# Patient Record
Sex: MALE | Race: WHITE | NOT HISPANIC OR LATINO | Employment: FULL TIME | URBAN - METROPOLITAN AREA
[De-identification: names, ages, dates, MRNs, and addresses within clinical notes are randomized per-mention and may not be internally consistent; named-entity substitution may affect disease eponyms.]

---

## 2018-10-12 ENCOUNTER — RECORDS - HEALTHEAST (OUTPATIENT)
Dept: LAB | Facility: CLINIC | Age: 53
End: 2018-10-12

## 2018-10-12 LAB — PSA SERPL-MCNC: 0.9 NG/ML (ref 0–3.5)

## 2021-05-26 ENCOUNTER — RECORDS - HEALTHEAST (OUTPATIENT)
Dept: ADMINISTRATIVE | Facility: CLINIC | Age: 56
End: 2021-05-26

## 2021-05-27 ENCOUNTER — RECORDS - HEALTHEAST (OUTPATIENT)
Dept: ADMINISTRATIVE | Facility: CLINIC | Age: 56
End: 2021-05-27

## 2021-05-30 ENCOUNTER — RECORDS - HEALTHEAST (OUTPATIENT)
Dept: ADMINISTRATIVE | Facility: CLINIC | Age: 56
End: 2021-05-30

## 2021-10-28 ENCOUNTER — OFFICE VISIT (OUTPATIENT)
Dept: NEUROLOGY | Facility: CLINIC | Age: 56
End: 2021-10-28
Payer: COMMERCIAL

## 2021-10-28 VITALS
WEIGHT: 301.6 LBS | BODY MASS INDEX: 36.73 KG/M2 | SYSTOLIC BLOOD PRESSURE: 121 MMHG | HEART RATE: 84 BPM | HEIGHT: 76 IN | DIASTOLIC BLOOD PRESSURE: 69 MMHG

## 2021-10-28 DIAGNOSIS — R47.9 SPEECH DISTURBANCE, UNSPECIFIED TYPE: ICD-10-CM

## 2021-10-28 DIAGNOSIS — R47.89 WORD FINDING DIFFICULTY: ICD-10-CM

## 2021-10-28 DIAGNOSIS — R47.01 EXPRESSIVE APHASIA: Primary | ICD-10-CM

## 2021-10-28 PROCEDURE — 99204 OFFICE O/P NEW MOD 45 MIN: CPT | Performed by: PSYCHIATRY & NEUROLOGY

## 2021-10-28 ASSESSMENT — MONTREAL COGNITIVE ASSESSMENT (MOCA)
13. ORIENTATION SUBSCORE: 6
11. FOR EACH PAIR OF WORDS, WHAT CATEGORY DO THEY BELONG TO (OUT OF 2): 2
WHAT IS THE TOTAL SCORE (OUT OF 30): 17
7. [VIGILENCE] TAP WHEN HEARING DESIGNATED LETTER: 1
4. NAME EACH OF THE THREE ANIMALS SHOWN: 3
WHAT LEVEL OF EDUCATION WAS ATTAINED: 0
8. SERIAL SUBTRACTION OF 7S: 2
12. MEMORY INDEX SCORE: 0
10. [FLUENCY] NAME WORDS STARTING WITH DESIGNATED LETTER: 0
9. REPEAT EACH SENTENCE: 0
VISUOSPATIAL/EXECUTIVE SUBSCORE: 2
6. READ LIST OF DIGITS [FORWARD/BACKWARD]: 1

## 2021-10-28 ASSESSMENT — MIFFLIN-ST. JEOR: SCORE: 2309.55

## 2021-10-28 NOTE — PATIENT INSTRUCTIONS
Plan:  -- MRI Brain. We will notify you of the results.  -- Referral to Neuropsych for cognitive/language evaluation.  Based on what the testing shows, I will make additional recommendations for evaluation/treatment.  -- Return to Neurology clinic after the neuropsych testing is completed.

## 2021-10-28 NOTE — NURSING NOTE
IMTIAZ COGNITIVE ASSESSMENT (MOCA)  Version 7.1 Original Version  VISUOSPATIAL/EXECUTIVE               COPY CUBE      [  0  ]                                [  0  ] DRAW CLOCK (Ten past eleven)  (3 points)    [1    ]                    [ 1   ]               [ 0   ]       Contour            Numbers     Hands POINTS                 2  / 5   NAMING    [ 1  ]                                                                        [ 1   ]                                             [  1  ]  Lireid Valadez                                Camel                   3  / 3   MEMORY Read list of words, subject must repeat them. Do 2 trials, even if 1st trial is successful. Do a recall after 5 minutes  FACE VELVET Hoahaoism FRITZ RED No Points    1st + + + - -     2nd + + + + +    ATTENTION Read list of digits (1 digit/sec) Subject has to repeat in the forward order       [  0  ]   2  1  8  5  4                                [ 1   ] 7 4 2                        1  /2   Read list of letters. The subject must tap with his hand at each letter A. No points if > 2 errors.  [  1  ] F B A C M N A A J K L B A F A K D E A A A J A M O F A A B          1    /1   Serial 7 subtraction starting at 100          [   1 ] 93         [  1  ] 86          [  1  ] 79          [  0  ] 72         [  0  ] 65   4 or 5 correct subtractions: 3 points,  2 or 3 correct: 2 points,  1correct: 1 point,   0 correct: 0 points         2   /3   LANGUAGE Repeat: I only know that Abad is the one to help today. [ 0    ]                                      The cat always hid under the couch when dogs were in the room. [ 0  ]             0  /2   Fluency: Name maximum number of words in one minute that begin with the letter F                                                                                                                    [ 0   ] __5_ (N > 11 words)             0  /1   ABSTRACTION  Similarity between e.g. banana-orange=fruit                                                                   [ 1   ] train-bicycle                      [  1  ] watch-ruler           2   /2   DELAYED  RECALL Has to recall words  WITH NO CUE FACE  [ 0   ] VELVET  [ 0   ] Rastafari  [ 0   ]  FRITZ  [  0  ] RED  [ 0   ] Points for UNCUED recall only         0   /5           OPTIONAL Category cue           Multiple choice cue          ORIENTATION  [ 1   ] Date     [ 1   ] Month       [  1  ] Year      [  1  ] Day      [  1  ] Place        [  1  ] City        6 /6   TOTAL  Normal > 26/30 Add 1 point if < 12 years education     17 /30

## 2021-10-28 NOTE — PROGRESS NOTES
"INITIAL NEUROLOGY CONSULTATION    DATE OF VISIT: 10/28/2021  MRN: [id number]  PATIENT NAME: Robbin Forrester  YOB: 1965    REFERRING PROVIDER: Vic Hanna    Chief Complaint   Patient presents with     Episodes of speech difficulty       SUBJECTIVE:                                                      HPI:   Robbin Forrester is a 56 year old male whom I have been asked by Dr. Hanna to see in consultation for \"memory/speech.\"  There are very few records available on the patient.  The medical records department at [facility] he was last seen there in January 2020.  The provider listed as the referring provider, saw the patient back in 2013 in the [facility] ER, no more-recent visits.    Robbin is accompanied by his wife today.  They confirm that he does still follow-up with Dr. Hanna, at an [facility] clinic on [address].  They ask the reason for his visit, to be starts to tell me about his work schedule being somewhat unusual.  At first I do not really understand what he is trying to tell me.  Through clarification I found out that he works third shift.  Because of this his sleep cycle has been disrupted in recent years.  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  Robbin tells me that at work, he has 6 different duties at work. When he gets to the end of the day, he tends to fall asleep with his dogs. When he is woken from sleep, he has trouble with slowness and difficulty with annunciation. His wife confirms that his speech is much worse when he first wakes. She has noticed word-finding difficulties in general for about 2 years, which correlates with the time he started working overnights. No problems with memory from their standpoint.  He has found it helpful to try and keep his dogs on a regular schedule, so that he is woken less frequently.  On his weekends, or if his wife is home, he is able to get much more restful sleep.    His week begins on Sunday. He says he can be up 24-26 hours in a row.  " "He has not really noticed problems with remembering people's names.  No mention of problems with writing.    He denies problems with driving, though his wife has noticed that he crosses over the [place] line sometimes, and roundabouts are very difficult for him to maneuver.  He does have an eye condition that affects his ability to see well during the day, so she wonders if it is related to this.  He feels that he sees better driving at night.    He has not had problems with getting lost driving.      Robbin denies weakness or sensory changes.  He is not typically on headaches.  No problems with swallow.  No problems controlling his bladder or bowels.  Wife has not noticed a change in his gait.  He has not had any work-up for the language issues as of yet.  No recent brain imaging.    He has 3 dogs.    No family history of dementia.    History reviewed. No pertinent past medical history.  History reviewed. No pertinent surgical history.    MULTIPLE VITAMIN PO, Take by mouth daily    No current facility-administered medications on file prior to visit.    Allergies   Allergen Reactions     Cat Hair Extract Unknown        Problem (# of Occurrences) Relation (Name,Age of Onset)    Cancer (1) Mother        Social History     Tobacco Use     Smoking status: Never Smoker     Smokeless tobacco: Never Used   Substance Use Topics     Alcohol use: None     Comment: seldom     Drug use: Not Currently       REVIEW OF SYSTEMS:                                                      10-point review of systems is negative except as mentioned above in HPI.     EXAM:                                                      Physical Exam:   Vitals: /69 (BP Location: Right arm)   Pulse 84   Ht 1.93 m (6' 4\")   Wt 136.8 kg (301 lb 9.6 oz)   BMI 36.71 kg/m    BMI= Body mass index is 36.71 kg/m .  GENERAL: NAD.  HEENT: NC/AT.   CV: RRR. S1, S2.   NECK: No bruits.  PULM: Non-labored breathing.   Neurologic:  MENTAL STATUS: Alert, attentive. " Speech is slow, with hesitation and clear word finding difficulties.  He does not seem to have any problems with comprehension.. Normal comprehension. MoCA: 17/30 (normal is 26 and above). Normal concentration. Fair fund of knowledge.   CRANIAL NERVES: Discs flat. Visual fields intact to confrontation. Pupils equally, round and reactive to light. Facial sensation and movement normal. EOM full. Hearing intact to conversation. Trapezius strength intact. Palate moves symmetrically. Tongue midline.  MOTOR: 5/5 in proximal and distal muscle groups of upper and lower extremities. Tone and bulk normal.   DTRs: Intact and symmetric in biceps, BR, patellae.  Babinski down-going bilaterally.   SENSATION: Normal light touch and pinprick. Intact proprioception at great toes. Vibration: Decreased at both ankles.   COORDINATION: Normal finger nose finger. Finger tapping normal. Knee heel shin normal.  STATION AND GAIT: Romberg negative.  Casual gait is normal.  Left hand-dominant.  No frontal release signs.    ASSESSMENT and PLAN:                                                      Assessment:     ICD-10-CM    1. Expressive aphasia  R47.01    2. Speech disturbance, unspecified type  R47.9 MR Brain w/o & w Contrast     NEUROPSYCHOLOGY REFERRAL   3. Word finding difficulty  R47.89 MR Brain w/o & w Contrast     NEUROPSYCHOLOGY REFERRAL        Mr. Forrester is a pleasant 56-year-old man with little medical history, here for evaluation regarding speech difficulties.  On exam, he has difficulty with expressive language and word-finding difficulties.  He also seems a little bit slow to answer my questions, though this could just be due to his speech difficulties. He scored 17/30 on the [place] today, with difficulties in multiple tasks including visuospatial, recall, attention and significantly: language.    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  Of course, he does have this history of a change in his  schedule with work, which could be contributing.  I explained to Robbin that some speech conditions can be rehabilitated, really like to make sure you understand what it is that we are dealing with at this point.  He is in agreement.  I would like to get an MRI of his brain to rule out structural cause and then send him on for neuropsych evaluation.  We will see him back thereafter.  Robbin and his wife expressed understanding and agreement with the plan.    Plan:  -- MRI Brain. We will notify you of the results.  -- Referral to Neuropsych for cognitive/language evaluation.  Based on what the testing shows, I will make additional recommendations for evaluation/treatment.  -- Return to Neurology clinic after the neuropsych testing is completed.     Total Time: 45 minutes were spent with the patient and in chart review/documentation (as described above in Assessment and Plan) /coordinating the care on date of service.    Irma Jackson MD  Neurology    CC: Vic Hanna MD    Dragon software used in the dictation of this note.

## 2021-10-28 NOTE — NURSING NOTE
Chief Complaint   Patient presents with     Episodes of speech difficulty       Dariela Craft RN on 10/28/2021 at 1:40 PM

## 2021-10-28 NOTE — LETTER
"    10/28/2021         RE: Robbin Forrester  [address] [address]        Dear Colleague,    Thank you for referring your patient, Robbin Forrester, to the [facility] [place]. Please see a copy of my visit note below.    INITIAL NEUROLOGY CONSULTATION    DATE OF VISIT: 10/28/2021  MRN: [id number]  PATIENT NAME: Robbin Forrester  YOB: 1965    REFERRING PROVIDER: Vic Hanna    Chief Complaint   Patient presents with     Episodes of speech difficulty       SUBJECTIVE:                                                      HPI:   Robbin Forrester is a 56 year old male whom I have been asked by Dr. Hanna to see in consultation for \"memory/speech.\"  There are very few records available on the patient.  The medical records department at [facility] he was last seen there in January 2020.  The provider listed as the referring provider, saw the patient back in 2013 in the [facility] ER, no more-recent visits.    Robbin is accompanied by his wife today.  They confirm that he does still follow-up with Dr. Hanna, at an [facility] clinic on [address].  They ask the reason for his visit, to be starts to tell me about his work schedule being somewhat unusual.  At first I do not really understand what he is trying to tell me.  Through clarification I found out that he works third shift.  Because of this his sleep cycle has been disrupted in recent years.  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  Robbin tells me that at work, he has 6 different duties at work. When he gets to the end of the day, he tends to fall asleep with his dogs. When he is woken from sleep, he has trouble with slowness and difficulty with annunciation. His wife confirms that his speech is much worse when he first wakes. She has noticed word-finding difficulties in general for about 2 years, which correlates with the time he started working overnights. No problems with memory from their standpoint.  He has found it " helpful to try and keep his dogs on a regular schedule, so that he is woken less frequently.  On his weekends, or if his wife is home, he is able to get much more restful sleep.    His week begins on Sunday. He says he can be up 24-26 hours in a row.  He has not really noticed problems with remembering people's names.  No mention of problems with writing.    He denies problems with driving, though his wife has noticed that he crosses over the [place] line sometimes, and roundabouts are very difficult for him to maneuver.  He does have an eye condition that affects his ability to see well during the day, so she wonders if it is related to this.  He feels that he sees better driving at night.    He has not had problems with getting lost driving.      Robbin denies weakness or sensory changes.  He is not typically on headaches.  No problems with swallow.  No problems controlling his bladder or bowels.  Wife has not noticed a change in his gait.  He has not had any work-up for the language issues as of yet.  No recent brain imaging.    He has 3 dogs.    No family history of dementia.    History reviewed. No pertinent past medical history.  History reviewed. No pertinent surgical history.    MULTIPLE VITAMIN PO, Take by mouth daily    No current facility-administered medications on file prior to visit.    Allergies   Allergen Reactions     Cat Hair Extract Unknown        Problem (# of Occurrences) Relation (Name,Age of Onset)    Cancer (1) Mother        Social History     Tobacco Use     Smoking status: Never Smoker     Smokeless tobacco: Never Used   Substance Use Topics     Alcohol use: None     Comment: seldom     Drug use: Not Currently       REVIEW OF SYSTEMS:                                                      10-point review of systems is negative except as mentioned above in HPI.     EXAM:                                                      Physical Exam:   Vitals: /69 (BP Location: Right arm)   Pulse 84    "Ht 1.93 m (6' 4\")   Wt 136.8 kg (301 lb 9.6 oz)   BMI 36.71 kg/m    BMI= Body mass index is 36.71 kg/m .  GENERAL: NAD.  HEENT: NC/AT.   CV: RRR. S1, S2.   NECK: No bruits.  PULM: Non-labored breathing.   Neurologic:  MENTAL STATUS: Alert, attentive. Speech is slow, with hesitation and clear word finding difficulties.  He does not seem to have any problems with comprehension.. Normal comprehension. MoCA: 17/30 (normal is 26 and above). Normal concentration. Fair fund of knowledge.   CRANIAL NERVES: Discs flat. Visual fields intact to confrontation. Pupils equally, round and reactive to light. Facial sensation and movement normal. EOM full. Hearing intact to conversation. Trapezius strength intact. Palate moves symmetrically. Tongue midline.  MOTOR: 5/5 in proximal and distal muscle groups of upper and lower extremities. Tone and bulk normal.   DTRs: Intact and symmetric in biceps, BR, patellae.  Babinski down-going bilaterally.   SENSATION: Normal light touch and pinprick. Intact proprioception at great toes. Vibration: Decreased at both ankles.   COORDINATION: Normal finger nose finger. Finger tapping normal. Knee heel shin normal.  STATION AND GAIT: Romberg negative.  Casual gait is normal.  Left hand-dominant.  No frontal release signs.    ASSESSMENT and PLAN:                                                      Assessment:     ICD-10-CM    1. Expressive aphasia  R47.01    2. Speech disturbance, unspecified type  R47.9 MR Brain w/o & w Contrast     NEUROPSYCHOLOGY REFERRAL   3. Word finding difficulty  R47.89 MR Brain w/o & w Contrast     NEUROPSYCHOLOGY REFERRAL        Mr. Forrester is a pleasant 56-year-old man with little medical history, here for evaluation regarding speech difficulties.  On exam, he has difficulty with expressive language and word-finding difficulties.  He also seems a little bit slow to answer my questions, though this could just be due to his speech difficulties. He scored 17/30 on the [place] " today, with difficulties in multiple tasks including visuospatial, recall, attention and significantly: language.    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  Of course, he does have this history of a change in his schedule with work, which could be contributing.  I explained to Robbin that some speech conditions can be rehabilitated, really like to make sure you understand what it is that we are dealing with at this point.  He is in agreement.  I would like to get an MRI of his brain to rule out structural cause and then send him on for neuropsych evaluation.  We will see him back thereafter.  Robbin and his wife expressed understanding and agreement with the plan.    Plan:  -- MRI Brain. We will notify you of the results.  -- Referral to Neuropsych for cognitive/language evaluation.  Based on what the testing shows, I will make additional recommendations for evaluation/treatment.  -- Return to Neurology clinic after the neuropsych testing is completed.     Total Time: 45 minutes were spent with the patient and in chart review/documentation (as described above in Assessment and Plan) /coordinating the care on date of service.    Irma Jackson MD  Neurology    CC: Vic Hanna MD    Dragon software used in the dictation of this note.        Again, thank you for allowing me to participate in the care of your patient.        Sincerely,        Irma Jackson MD

## 2021-12-07 ENCOUNTER — HOSPITAL ENCOUNTER (OUTPATIENT)
Dept: MRI IMAGING | Facility: HOSPITAL | Age: 56
Discharge: HOME | End: 2021-12-07
Attending: PSYCHIATRY & NEUROLOGY | Admitting: PSYCHIATRY & NEUROLOGY
Payer: COMMERCIAL

## 2021-12-07 DIAGNOSIS — R47.9 SPEECH DISTURBANCE, UNSPECIFIED TYPE: ICD-10-CM

## 2021-12-07 DIAGNOSIS — R47.89 WORD FINDING DIFFICULTY: ICD-10-CM

## 2021-12-07 PROCEDURE — 70553 MRI BRAIN STEM W/O & W/DYE: CPT

## 2021-12-07 PROCEDURE — 255N000002 HC RX 255 OP 636: Performed by: PSYCHIATRY & NEUROLOGY

## 2021-12-07 PROCEDURE — A9585 GADOBUTROL INJECTION: HCPCS | Performed by: PSYCHIATRY & NEUROLOGY

## 2021-12-07 RX ORDER — GADOBUTROL 604.72 MG/ML
10 INJECTION INTRAVENOUS ONCE
Status: COMPLETED | OUTPATIENT
Start: 2021-12-07 | End: 2021-12-07

## 2021-12-07 RX ADMIN — GADOBUTROL 10 ML: 604.72 INJECTION INTRAVENOUS at 11:47

## 2021-12-08 NOTE — RESULT ENCOUNTER NOTE
Please let Von know that his brain MRI shows nothing acute from a structural standpoint, it does show some age-related shrinkage and vessel changes which are not unexpected.  We will follow-up after his neuropsych testing is completed next spring.    Thank you,Dr. Jackson

## 2021-12-10 ENCOUNTER — TELEPHONE (OUTPATIENT)
Dept: NEUROLOGY | Facility: CLINIC | Age: 56
End: 2021-12-10
Payer: COMMERCIAL

## 2021-12-10 NOTE — TELEPHONE ENCOUNTER
----- Message from Irma Jackson MD sent at 12/8/2021 10:00 AM CST -----  Please let Von know that his brain MRI shows nothing acute from a structural standpoint, it does show some age-related shrinkage and vessel changes which are not unexpected.  We will follow-up after his neuropsych testing is completed next spring.    Thank you,Dr. Jackson

## 2021-12-10 NOTE — TELEPHONE ENCOUNTER
Patient's spouse Shira informed of Dr. Marquis's message. Consent to communicate in the chart. No further questions or concerns.     DOUG Comer on 12/10/2021 at 11:40 AM

## 2021-12-11 ENCOUNTER — HEALTH MAINTENANCE LETTER (OUTPATIENT)
Age: 56
End: 2021-12-11

## 2021-12-13 ENCOUNTER — TELEPHONE (OUTPATIENT)
Dept: NEUROLOGY | Facility: CLINIC | Age: 56
End: 2021-12-13
Payer: COMMERCIAL

## 2022-09-25 ENCOUNTER — HEALTH MAINTENANCE LETTER (OUTPATIENT)
Age: 57
End: 2022-09-25

## 2022-09-29 ENCOUNTER — LAB REQUISITION (OUTPATIENT)
Dept: LAB | Facility: CLINIC | Age: 57
End: 2022-09-29

## 2022-09-29 DIAGNOSIS — R47.02 DYSPHASIA: ICD-10-CM

## 2022-09-29 DIAGNOSIS — Z13.1 ENCOUNTER FOR SCREENING FOR DIABETES MELLITUS: ICD-10-CM

## 2022-09-29 DIAGNOSIS — Z12.5 ENCOUNTER FOR SCREENING FOR MALIGNANT NEOPLASM OF PROSTATE: ICD-10-CM

## 2022-09-29 DIAGNOSIS — Z13.6 ENCOUNTER FOR SCREENING FOR CARDIOVASCULAR DISORDERS: ICD-10-CM

## 2022-09-29 LAB
ANION GAP SERPL CALCULATED.3IONS-SCNC: 11 MMOL/L (ref 7–15)
BUN SERPL-MCNC: 13.5 MG/DL (ref 6–20)
CALCIUM SERPL-MCNC: 9.3 MG/DL (ref 8.6–10)
CHLORIDE SERPL-SCNC: 101 MMOL/L (ref 98–107)
CHOLEST SERPL-MCNC: 176 MG/DL
CREAT SERPL-MCNC: 1.1 MG/DL (ref 0.67–1.17)
DEPRECATED HCO3 PLAS-SCNC: 28 MMOL/L (ref 22–29)
GFR SERPL CREATININE-BSD FRML MDRD: 79 ML/MIN/1.73M2
GLUCOSE SERPL-MCNC: 120 MG/DL (ref 70–99)
HDLC SERPL-MCNC: 36 MG/DL
LDLC SERPL CALC-MCNC: 107 MG/DL
NONHDLC SERPL-MCNC: 140 MG/DL
POTASSIUM SERPL-SCNC: 4.4 MMOL/L (ref 3.4–5.3)
PSA SERPL-MCNC: 0.81 NG/ML (ref 0–3.5)
SODIUM SERPL-SCNC: 140 MMOL/L (ref 136–145)
TRIGL SERPL-MCNC: 164 MG/DL

## 2022-09-29 PROCEDURE — 80048 BASIC METABOLIC PNL TOTAL CA: CPT | Performed by: FAMILY MEDICINE

## 2022-09-29 PROCEDURE — G0103 PSA SCREENING: HCPCS | Performed by: FAMILY MEDICINE

## 2022-09-29 PROCEDURE — 80061 LIPID PANEL: CPT | Performed by: FAMILY MEDICINE

## 2022-11-11 ENCOUNTER — TRANSCRIBE ORDERS (OUTPATIENT)
Dept: OTHER | Age: 57
End: 2022-11-11

## 2022-11-11 DIAGNOSIS — R47.89 OTHER SPEECH DISTURBANCES: ICD-10-CM

## 2022-11-11 DIAGNOSIS — R41.9 NEUROCOGNITIVE DISORDER: Primary | ICD-10-CM

## 2023-02-04 ENCOUNTER — HEALTH MAINTENANCE LETTER (OUTPATIENT)
Age: 58
End: 2023-02-04

## 2023-04-14 ENCOUNTER — OFFICE VISIT (OUTPATIENT)
Dept: NEUROLOGY | Facility: CLINIC | Age: 58
End: 2023-04-14
Payer: COMMERCIAL

## 2023-04-14 DIAGNOSIS — G31.01 PRIMARY PROGRESSIVE APHASIA (H): ICD-10-CM

## 2023-04-14 DIAGNOSIS — F02.A0 MAJOR NEUROCOGNITIVE DISORDER, DUE TO FRONTOTEMPORAL LOBAR DEGENERATION, WITHOUT BEHAVIORAL DISTURBANCE, MILD (H): Primary | ICD-10-CM

## 2023-04-14 DIAGNOSIS — G31.09 MAJOR NEUROCOGNITIVE DISORDER, DUE TO FRONTOTEMPORAL LOBAR DEGENERATION, WITHOUT BEHAVIORAL DISTURBANCE, MILD (H): Primary | ICD-10-CM

## 2023-04-14 DIAGNOSIS — F02.80 PRIMARY PROGRESSIVE APHASIA (H): ICD-10-CM

## 2023-04-14 PROCEDURE — 96132 NRPSYC TST EVAL PHYS/QHP 1ST: CPT | Performed by: CLINICAL NEUROPSYCHOLOGIST

## 2023-04-14 PROCEDURE — 96133 NRPSYC TST EVAL PHYS/QHP EA: CPT | Performed by: CLINICAL NEUROPSYCHOLOGIST

## 2023-04-14 PROCEDURE — 96116 NUBHVL XM PHYS/QHP 1ST HR: CPT | Performed by: CLINICAL NEUROPSYCHOLOGIST

## 2023-04-14 PROCEDURE — 96138 PSYCL/NRPSYC TECH 1ST: CPT | Performed by: CLINICAL NEUROPSYCHOLOGIST

## 2023-04-14 PROCEDURE — 96139 PSYCL/NRPSYC TST TECH EA: CPT | Performed by: CLINICAL NEUROPSYCHOLOGIST

## 2023-04-14 NOTE — LETTER
4/14/2023         RE: Robbin Forrester  [address] [facility] [address]        Dear Colleague,    Thank you for referring your patient, Robbin Forrester, to the [facility] NEUROLOGY [facility]. Please see a copy of my visit note below.    NEUROPSYCHOLOGY CONSULT  Formerly [facility]    NAME: Robbin Forrester    YOB: 1965   AGE: 57  EDU: 13  DATE OF EVALUATION: 4/14/2023    REASON FOR REFERRAL:  Mr. Forrester is a 57 year old, left-handed, White male presenting with family concerns about progressive language disturbance. He was referred for a neurocognitive evaluation by his neurologist, Dr. Gurinder Jackson from [facility] Neurology [facility] to assist with differential diagnosis and care planning.     DIAGNOSTIC SUMMARY:  Due to the current COVID-19 pandemic that limits contact during in-person clinical visits, the testing portion of this assessment was conducted using face-to-face methods with PPE worn by the examiner and a face-mask for the patient. The standard administration of these tests involves in-person, direct face-to-face methods. The full impact of applying non-standard administration methods with PPE is not fully appreciated at this time. The diagnostic conclusions and recommendations provided in this report are being advanced with caution.    With these limitations in mind, results of testing indicate that Mr. Forrester is a gentleman of estimated average premorbid intellectual functioning whose performance is notable only for intact receptive picture vocabulary in the context of global cognitive impairment. Specifically, he demonstrated generally moderate to severe deficits across measures of basic attention, cognitive efficiency, learning (both verbal and nonverbal), memory (both verbal and nonverbal), language (fund of knowledge, sight word reading, spelling, confrontation naming, semantic fluency, repetition, comprehension), visuospatial  reasoning skills, and executive functioning (complex attention, working memory, phonemic fluency, visuospatial planning and organization). Finally, on a self-report questionnaire, he denied any significant symptoms of depression or anxiety.     I was able to share the above results along with my impressions and recommendations (see below) with Mr. Forrester and his family (wife Shira, son Katharine) on the day of testing. I provided the opportunity for them to ask questions about the evaluation and results. At the end of our meeting, they indicated that they understood the results and that I had answered all of their questions. They were encouraged to reach out to me (contact information included in the AVS) should any further questions or concerns arise in the future. (Mr. Forrester provided verbal consent for me to talk to Shira or Katharine if they should call with questions.) I explained that I would send the impressions and recommendations from the report as part of the After Visit Summary (which will be directed to clinic staff to print and send by mail) and that Dr. Gurinder Jackson would be receiving the full report as the consulting provider as would his PCP.     Summary for Providers  ASSESSMENT:    Impairments (mostly moderate to severe) identified across all domains assessed including attention, speed of thinking, language, learning, memory, and executive functioning    Ironically, his strongest and only intact score was on a language task, a measure of single word comprehension     Of note, memory deficits are retrieval based (I.e., he demonstrates some, albeit limited, benefit from cues)    Test scores together with his behavioral presentation, and history of symptoms reported by family (including progressive language decline with more recent memory difficulties) suggest that his presentation very likely represents a Primary Progressive Aphasia (likely semantic variant given impairments in confrontation naming,  regularization errors in reading and writing and notable word finding difficulties in conversational speech but intact grammar and fluency)    It is notable, however, that his language deficits are pervasive and impacting even over-learned skills (sight word reading, speeding reading of simple words) as well as skills that would normally be persevered in Semantic variant PPA (repetition, comprehension). Unfortunately I suspect that this relates to progression of the condition that has been evident for upwards of 3-4 years now (initallly presented to Neurology in the fall of 2021 with difficulties evident 1-2 years prior).     The global nature of cognitive deficits outside of language (I.e., memory, attention, cognitive efficiency and executive functioning) are also likely explained by progression of PPA, which although initially impacts language primarily, progresses over time to impact all cognitive skills    Of note, concern was raised about third shift work and whether insufficient sleep and disrupted sleep patterns could account for his presentation.  While it is very likely that sleep disruption is exacerbating or exaggerating his difficulties, it is highly unlikely that poor sleep alone can account for this presentation.    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 While this may have been the case to some extent, he was able to see adequately on a number of tasks (including a picture vocabulary task where he demonstrated average performance) and performed poorly on many tasks without a visual component. Reduced vision alone cannot account for his deficits on testing.    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 I'm very surprised that he has been able to continue work this long without mistakes and I suspect there may be errors  that are not being detected    A referral to Social Work/ Care Management would be appropriate to assist with transition from work and general support with changes moving forward. As his PCP is in the [facility] network, he will need to pursue services there. If Dr. Gurinder Jackson is aware of another avenue into Care Management in the [facility] system, I would happily defer to her to facilitate that process.     Strongly recommend that he discontinue driving (given impairments in memory, speeded processing and executive functioning). He could consider a driving evaluation but as his cognition will continue to decline over time, it is unlikely he would be able to continue driving for very long.     It is recommended that his family continue to manage daily activities (I.e., cooking, finances, driving)    A trial of speech therapy may be beneficial to maximize language functioning.     Given the severity of his cognitive deficits, no future testing is recommended at this time unless questions of diagnosis or dispositional planning arise     While I think it highly unlikely that sleep alone could account for his presentation, if significant cognitive improvement is evident following a return to normal sleep patterns, re-evaluation is recommended (no need to wait for 1 year, just as soon as significant improvements have been present for at least a month) to determine if PPA continues to represent the most appropriate diagnosis    FEEDBACK:  Mr. Forrester received the results of this evaluation on the day of testing.     Thank you for allowing me to participate in Mr. Forrester's care.  Please contact me with any questions regarding the content of this report.      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 Forrester retire and apply for disability. Given his severe cognitive deficits he will not be able to consistently perform his current job duties or any duties associated with traditional gainful employment.     Given his profile characterized by executive dysfunction, slowed processing speed and memory impairments, it is strongly recommended that Mr. Forrester stop driving. If he would like to continue driving, a formal driving evaluation is strongly recommended. Possible options for formal driving evaluations would be: [facility] ([phone]), [facility] program ([phone]) or any option recommended by his physician. However, given that his condition is progressive, it is unlikely he would be able to continue driving for very long.     A referral to Social Work/ Care Management would be appropriate to assist with transition from work and general support with changes moving forward. As his PCP is in the [facility] network, he will need to pursue services there. If Dr. Gurinder Jackson is aware of another avenue into Care Management in the [facility] system, I would happily defer to her to facilitate that process.     Mr. Forrester would benefit from help in his daily activities at home particularly in terms of managing finances. This would also be true for any medications that may be prescribed in the future.     It is strongly recommended that a family member or close friend accompany Mr. Forrester to all appointments to ensure that accurate information is given to providers and instructions are followed. It will be helpful to present the information in brief, repeated segments and have him repeat back the information in his own words to ensure understanding. But ultimately, his family  should be in charge of following through with any recommendations.     It is recommended that Mr. Forrester not venture into the community independently.  He is at high risk for being taken advantage of.  In addition, he is at risk for getting lost and turned around and may have significant problems finding his way home or even being able to express his home address to ask for help getting home.     If not already done, completion of paperwork for advance directives and assignment of healthcare and financial power of  should be considered at this time.    To the extent that his family can continue to provide support and assistance, it seems that the current living situation is working.  However, as more support is needed in the future or if his family is unable to provide that support, Mr. Forrester would benefit from increased supervision and support in his daily life so as to help his manage daily tasks such as cooking and cleaning as well as keeping track of medications and to ensure his personal safety. An assisted living facility would be ideal so as to allow his some independence while also providing a structured and supportive environment.    Family Resources    Mr. Forrester and his family may benefit from learning about Semantic Variant PPA (language variant FTD). The Association for Frontotemporal Degeneration (AFTD) has a lot of information ([phone]) or online at www.theaftd.org/understandingftd/disorders/semantic-dementia  Physical and Emotional Health    It is strongly recommended that as soon as he is able to stop working, that Mr. Forrester develop a regular sleep pattern including evaluating his current sleep hygiene behaviors and if need be, make changes to help facilitate sleep. Such changes might include avoiding watching television or reading in bed before attempting to fall asleep (instead, he should perform these activities outside of the bedroom), avoid eating, consuming caffeine or exercising  several hours before trying to fall asleep, avoid napping during the day, and establishing a regular bedtime and wake-up time.  Relaxation exercises (e.g., listening to soothing music, deep breathing, progressive muscle relaxation) right before going to bed might also help. If he tends to have difficulty returning to sleep in the night or in falling asleep due to worrying, cognitive strategies, which could be discussed with a therapist may also be useful. If these techniques do not improve his sleep, he may wish to consult his physician to assess for any underlying physical issues that may be impacting his sleep and to determine if a formal sleep study is warranted.    Mr. Forrester does not appear to be struggling with any significant emotional symptoms. Behavioral activation techniques such as regular exercise (under the guidance of his physician), recreational activities and regular social interaction would likely be effective in helping Mr. Forrester to continue to maintain his mood.   Memory and Organization    Mr. Kraus may benefit from a trial of speech therapy (I.e., cognitive rehabilitation) to determine if there are techniques or skills he can use to maximize his language abilities and also support his memory. We did discuss this during the feedback session and I shared that I would be happy to enter a referral through [facility], or they could discuss this in their upcoming appointment with Dr. Gurinder Jackson. They could also pursue speech therapy again through [facility].     Mr. Forrester is encouraged to continue to engage in stimulating activities, (i.e., reading, card games, puzzles) to keep him cognitively active.     In his daily life, Mr. Forrester may find it helpful to post reminder notes around the house, make lists, and carry a small calendar so that he can feel more comfortable and confident in his ability to remember information. A daily planner could also be used as a memory book where important information  is recorded and organized for future reference.     If needed, Mr. Forrester could also create a system to establish set locations for certain items (i.e., keys) such that he always knows where to put them upon entering the house and where to look when he needs them. If he would like to keep certain items out of sight (i.e., a wallet), he could set up a specific hidden place to keep items and use that same place so as to ensure he can find the required item when needed.     Mr. Forrester exhibits memory impairments but does show some benefit from cues, thus he will do best when provided with reminders to facilitate retrieval of important information.     When required to learn new information, Mr. Forrester performed best when information was repeated. When medical procedures or instructions are being explained to him, it might be helpful to present the information in brief, repeated segments and have him repeat back the information in his own words to ensure understanding.     Mr. Forrester will do best in an environment where a lot of routines are established so that he can follow a regular pattern for his daily or weekly routines.     Mr. Forrester demonstrates intact basic attention but significant memory impairments, thus, he should not be given instructions for tasks any more than a few minutes in the future.    Mr. Forrester should be aware that he may not be able to process information as quickly and efficiently as he once could. Thus he should allow himself extra time to complete tasks and not try and work under extreme time constraints.   Follow-up    Given the severity of Mr. Forrester's cognitive deficits, no future testing is recommended at this time unless questions of diagnosis or dispositional planning arise     While I think it highly unlikely that sleep alone could account for his presentation, if significant cognitive improvement is evident following a return to normal sleep patterns, re-evaluation is recommended (no need to wait  "for 1 year, just as soon as significant improvements have been present for at least a month).    --------------------------------EXTENDED REPORT--------------------------------  Verbal consent for neuropsychological testing was received following the provision of information about the nature of the evaluation, and the opportunity to ask questions.     HISTORY OF PRESENTING PROBLEM:    Relevant History  Mr. Forrester was initially seen for Neurology consult by Dr. Gurinder Jackson on 10/28/21 \"Mr. Forrester is a pleasant 56-year-old man with little medical history, here for evaluation regarding speech difficulties.  On exam, he has difficulty with expressive language and word-finding difficulties.  He also seems a little bit slow to answer my questions, though this could just be due to his speech difficulties. He scored 17/30 on the [place] today, with difficulties in multiple tasks including visuospatial, recall, attention and significantly: language.     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  Of course, he does have this history of a change in his schedule with work, which could be contributing.  I explained to Robbin that some speech conditions can be rehabilitated, really like to make sure you understand what it is that we are dealing with at this point.  He is in agreement.  I would like to get an MRI of his brain to rule out structural cause and then send him on for neuropsych evaluation.  We will see him back thereafter.  Robbin and his wife expressed understanding and agreement with the plan.\"     Neuropsychological testing was initially scheduled in March 2022 but Mr. Forrester canceled this appointment.  It was rescheduled for May 2022 and again he canceled this.  It was ultimately rescheduled for today's appointment.  His family later shared that he had canceled the first 2 appointments as he did not feel there was sufficient concern to go through the evaluation.    Current " "Interview  Mr. Forrester was accompanied by his wife Shira and son Katharine and was a variable historian. Together they provided the following information.     At the present time, Mr. Forrester reports that he has not noticed any significant difficulties with his thinking skills including memory, attention or language.  When asked if he has any difficulty getting his words out, he denied any problems and when asked if he had difficulties coming up with the right words he stated \"sometimes.\"  But in general said \"no, not really,\" when asked if there are any concerns about his language.      When we were specifically discussing language concerns, Mr. Forrester responded with \"it depends,\" and then went on to describe his job. He stated, \"I am a 3rd...3rd... what is it...\" attempting to describe how he works 3rd shift (10pm -6am). He then went on to describe his job, \"its great to get all your... oh boy....... I can get all.... my work done in 4 hours.\" He described how its easy for him to do his job and completes all his work very quickly, but then he just walks around (and occasionally falls asleep) for the remainder of his shift. When I asked him to describe his job to me, \"uh... what do I..... I have to show you.\" He then went on to say \"They have to go under... I'm always under a light...\" His family later shared that he is a  and does a lot of precision work. He was able to state that he has been working 3rd shift for the last 5 years but has been with the company for 17 years (could not spontaneously name, but recognized [employer]). Other than sometimes falling asleep at work, he denied any problems at his job and added that \"when I'm working, people come to me, I'm a... what do you call it.... if there's people who need... 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     I asked Mr. Forrester why we are wearing masks and he responded \"so we don't get... don't share... \" He was not aware of any major illnesses or viruses or sicknesses that have been going around.  He stated that he recognized the term COVID but could not tell me what COVID is.  He denied recognizing the term coronavirus.    Shira shared that they had gone to Neurology in 2021 and had been noticing problems with his language for some time before that.  She noted that she has been observing word loss and difficulty getting his words out and finding the right words.  She added that more recently she has been noticing trouble with recall.  Both she and Katharine were surprised about him not recognizing COVID.  She did share that on Easter he did not recognize the name of a restaurant where they have been many times.  Before that she had noticed him being forgetful of places but had attributed it to him not having been there recently.  She noted that he previously had excellent memory and used to be able to do things like recall four different credit card numbers along with expiration dates and the 3 digit codes.  She added that she feels that his word loss has been getting worse over time.  Katharine agreed with this information and added that his father had always had a great vocabulary so this change is pretty striking.  Shira also shared that there has been some difficulties at work as he has been having some unexcused absences.  She notes that her  says he has filled out forms asking for the time off but work denies receiving them and Mr. Forrester says that they have been lost or that the company lost them.  She added that they now need to do FMLA to make sure all the days are covered.  She was not aware of the number of absences he was taking.  When asked today why he had taken these times, Mr. Forrester said he just wanted to take time off.    Shira also shared that he was referred for speech therapy by his " "primary care provider but when they went to the appointment the therapist was not sure how best to help as there has not been any formal diagnosis.    With regard to the activities of daily living, Mr. Forrester reported that his wife does most of the cooking and she agreed but added that he used to do some, but does not do much now.  Shira shared and Mr. Forrester agreed that he does a lot of the cleaning in the household but Shira indicates that he is not cleaning and organizing at the same level he once did.  She acknowledges that she used to help more and that is part of the problem but not all of it.  Shira indicated that she took over management of the family finances about 7 to 8 months ago, as there were \"quite a few,\" missed payments.  Mr. Forrester does not take any prescription meds.  He continues to drive with no recent tickets, accidents or incidents of becoming lost.    MEDICAL HISTORY:  Mr. Forrester's family shared that he has always been in good physical health and does not take any prescription medications.     Mr. Forrester denied any history of stroke, seizure or head injury with loss of consciousness and his family agreed. Shira shared that her  has not tested positive for COVID or experienced an illness concerning for COVID.    Mr. 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  Otherwise he denied any significant sensory changes or disturbance in appetite. Shira shared that she is actually not sure how much he is eating as they are on different schedules.  She notes that he will eat cereal but while she makes food and has leftovers for him in the fridge, she is not sure if he actually eats them.  Mr. Forrester says he does.      With regard to sleep, Mr. Forrester acknowledges that this is a challenge noting that he has been working third shift for the " "last 5 years and it is hard for him to get quality sleep.  Shira added that he is not very good with sleep hygiene and also on his days off tends to revert to a more typical schedule where he is up during the day and sleeps at night.  Their best estimate is that he generally comes home from work, walks the dogs, watches some TV and goes to sleep around noon and only sleeps about 3 to 4 hours before getting up.  He might sleep 1 or 2 hours later in the evening before going to work at 10 PM.  Mr. Forrester states that he is often tired and acknowledges that he sometimes falls asleep at work.  Of note, Mr. Forrester did not work last night and he stated that he feels well rested this morning as he slept overnight.    With regard to exercise, Mr. Forrester acknowledged that he is not exercising regularly now and he would like to do that again.  He noted that his main form of exercise is walking the dogs.  His family agreed.  Shira shared that several years ago he was going to the gym several times a week and feels that some of his weight loss may be due to to muscle loss.    Diagnostic studies:  An MRI of the brain from 12/7/2021 revealed, \"The exam is somewhat limited by motion-related artifacts. No definite acute or subacute infarct. No mass, acute hemorrhage, or extra-axial fluid collections. Scattered nonspecific T2/FLAIR hyperintensities within the cerebral white matter most consistent with mild chronic microvascular ischemic change. Mild to moderate generalized cerebral atrophy. No hydrocephalus. Normal position of the cerebellar tonsils. There is a small probable developmental venous anomaly in the left posterior parietal region (series 10, images 16-18). There are also may be a mildly prominent transmantle venous structure along the anterior frontal lobe (series 10, image 13; series 11, image 6). Otherwise, no abnormal intracranial postcontrast   enhancement seen.\"    No past surgical history on file.    Current medications " "include (per medical record):   Current Outpatient Medications:      MULTIPLE VITAMIN PO, Take by mouth daily, Disp: , Rfl: .    FAMILY HISTORY:   Family medical history is significant for:   Family History   Problem Relation Age of Onset     Cancer Mother    Other than Ms. Forrester's paternal grandmother who developed Parkinson's in her 90s, they are unaware of any other neurologic history in the family.    PSYCHIATRIC AND SUBSTANCE USE HISTORY:  With regard to his psychiatric history, Mr. Forrester denied a history of past psychiatric conditions or mental health treatment and his family agreed.  At the current time, he described his mood as good.  He denied any ongoing symptoms of depression or anxiety.  He denied any suicidal ideation, plan or intent or hallucinations or delusions.  His family agreed that he is generally in a good mood and denied significant history of anxiety or depression.  Shira did share that he had a tendency towards being more \"grouchy\" with her in the past but this has largely disappeared in the last few years.    With regard to substance use, Mr. Forrester indicated that he does not drink very regularly and his family agreed noting that he might have a couple drinks a year.  He denied any history of problematic alcohol use and his family agreed.  He denied history of tobacco or recreational drug use and his family agreed.    SOCIAL HISTORY:  Mr. Forrester was born and raised in [facility]. He was unaware of any complications in his mother's pregnancy with him or in his birth, or delays in reaching developmental milestones.  He reported that he is the only one in his immediately family who is left-handed but both grandfathers were left-handed. He denied a history of early learning or attention difficulties, individualized instruction, or grade repetition. He described himself as an average student in high school, earning mostly Bs and Cs.  He did state that he tended to do better in more hands-on classes such " as mechanical work.  He struggled more in history and English classes.  He graduated on time with his class and estimated that he graduated towards the middle of his class.    Mr. 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  He initially worked for a number of companies but has most recently been at [employer] for 17 years working as a .  He is currently working the night shift and has been for the last 5 years.  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    Mr. Kraus was able to share that he has 1 son (Katharine) from a previous relationship (no grandchildren) and estimated that he has been  to his current wife Shira for 13 or 14 years (actually 11).    BEHAVIORAL OBSERVATIONS:   Mr. Forrester arrived 30 minutes early and accompanied by his wife Shira and son Katharine to today's appointment. He was appropriately dressed and groomed. He was alert and engaged during the interview. Gait was unremarkable. His mood was euthmyic and his affect was appropriately reactive. Rapport was easily established and eye contact was unremarkable. He was pleasant and cooperative. Prosody, and content of speech were grossly normal. However, significant word finding difficulties (but without paraphasic or grammatical errors) were evident throughout, leading to some hesitancy in his speech as he paused to take time to think or try and explain the word he was looking for. There was no evidence of a ricardo thought disorder; no hallucinations or delusions were apparent. Judgment and insight appeared limited.       Mr. Forrester appeared adequately motivated and engaged easily in the testing component of the evaluation.He attempted all tasks presented to him and worked  at a slow but steady pace. He did not appear overly frustrated by difficult or challenging tasks and responded appropriately to encouragement from the examiner. As during the interview, significant word finding difficulties were evident and he often took an extended period of time to indicate an answer. He denied being color blind but clearly struggled on tasks with a color component (Tokens, Color Naming). His family later shared that he has always had some difficulties discerning colors but not necessarily a classic red/green color blindness. Instructions often required repetition as he would indicate that he forgot what he was supposed to do. Otherwise, no other significant barriers to testing were observed and the following is judged to be a valid representation of Mr. Forrester's current cognitive strengths and weaknesses.    LIMITATIONS:  Due to circumstances that limit contact during in-person clinical visits, this assessment was conducted using face-to-face testing with the examiner wearing [place] Plus designated PPE and the patient wearing a face mask. The standard administration of these procedures involves in-person, face-to-face methods without PPE. The impact of applying non-standard administration methods has been evaluated only in part by scientific research. While every effort was made to simulate standard assessment practices, the diagnostic conclusions and recommendations for treatment provided in this report are being advanced with these limitations in mind.    TESTS ADMINISTERED:   BDAE (Complex Ideational Material-CIM, Estero Naming Test- BNT), Brief Visuospatial Memory Test - R Form 1 (BVMT-R), DKEFS (Color Word Interference- Word Reading only, Verbal Fluency)Generalized Anxiety Disorder-7 (TC-7), Patient Health Questionnaire (PHQ-9), Moss Verbal Learning Test - R Form 1 (HVLT-R), CUELLAR (Sentence Repetition Form 1, Tokens- Form B), Pe[place]ody Picture Vocabulary Test, Fourth Edition (PPVT-4),  Dakota-Osterrieth Complex Figure Test, copy only (RCFT), Trail Making Test (TMT), WAIS-IV (Information, Block Design, Digit Span), WRAT-4 Blue (Word Reading, Spelling), and WMS-III Information and Orientation.    [facility] norms were used for BDAE-CIM, BNT, TMT  (Raw scores in parentheses)    DESCRIPTIVE PERFORMANCE KEY:    Labels for tests with Normal Distributions  Score Label Standard Score %ile Rank   Exceptionally high score  > 130 > 98   Above average score 120-129 91-97   High average score 110-119 75-90   Average score  25-74   Low average score 80-89 9-24   Below average score 70-79 2-8   Exceptionally low score < 70 < 2     Labels for tests with Non-Normal Distributions  Score Label %ile Rank   Within normal expectations/ limits score (WNL) > 24   Low average score 9-24   Below average score 2-8   Exceptionally low score < 2     The following test results utilize score labels as adapted from Dalton Carlisle, Adrian Naylor, Leona Wyman, ELLEN Wade, Mendy Ross, Nigel Weston & Conference Participants (2020): American Academy of Clinical Neuropsychology consensus conference statement on uniform labeling of performance test scores, The Clinical Neuropsychologist, DOI: 10.1080/[email or web address]    All scores contain some measure of error; scores are reported here as they are obtained by the individual (without reference to the range of error). These are meant as labels and not interpretation of performance. While other relevant comments regarding task performance are provided below, please see the Assessment, Impressions and Diagnostic Summary sections of this report for interpretation of the scores and the cognitive profile as a whole, including what does and does not constitute impairment.    OPTIMAL PREMORBID INTELLECT:  Optimal premorbid intellectual abilities were estimated as falling in the average range based on Mr. Forrester's educational and  "occupational histories.    SUMMARY OF TEST RESULTS:     Orientation, Attention and Processing Speed  Mental status exam was measured as a below average score for his age (10). He was oriented to personal information, city, year, month and day of the week and was able to correctly name the current president. He knew he was at a hospital but could not say which one. He stated it was the 15th (actually 14th). He could not name the most recent president and was almost an hour off when estimating the time.     Performance on a measure of basic attention and working memory was assessed as an exceptionally low score (12). This reflected a below average score for basic attention skills (LDF = 4) and working memory scores that ranged from an exceptionally low score (LDS = 2) to a low average score (LDB = 3).    A simple sequencing task (114\" ) was assessed as an exceptionally low score. A speeded word reading task (48\") was also assessed as an exceptionally low score.     Language  Sight word reading skills (49) were assessed as a below average score. Spelling skills (26) assessed as a below average score. Fund of general knowledge (4) was assessed as a below average score. His performance on a measure of semantic fluency was measured as an exceptionally low score (19). Confrontation naming was measured as an exceptionally low score (30). Comprehension of increasingly complex commands was measured as an exceptionally low score (26). Repetition of sentences was assessed as an exceptionally low score (4). On the Complex Ideational Material, he also earned an exceptionally low score (6). In contrast, receptive single word comprehension was measured as an average score (212).     Visuospatial Skills  Performance on a block construction task (4) resulted in an exceptionally low score. He had significant difficulty copying a series of 6 geometric figures (3). While he was able to generally recreate the overall gestalt of each " figure, they were drawn somewhat carelessly and with poor attention to detail.     Learning and Memory  He was administered a measure of rote auditory verbal list learning that required him to learn a series of 12 words over three trials and retain and recall them over a delay. His initial rate of learning (3,5,6) reflected an exceptionally low score. He retained and recalled no words (0% retention) after the delay for an exceptionally low score for delayed recall. Recognition memory was measured as a below average score as he correctly identified 9 of the original words and made 1 false positive error.     Immediate nonverbal memory (0,0,0) for a series of 6 geometric figures was measured as an exceptionally low score while delayed recall of these figures (no details) resulted in an exceptionally low score. Recognition memory was measured as an exceptionally low score as he correctly identified 5 of the original figures and made 3 false positive errors.    Executive Functioning  Working memory skills ranged from an exceptionally low score (LDS = 2) to a low average score (LDB = 3). A complex sequencing and set shifting task was discontinued at 6 minutes (at C) and notable for 4 errors by that point. His performance on a measure of phonemic fluency resulted in an exceptionally low score (13). Semantic set shifting skills were also measured as an exceptionally low score (Category Switching Accuracy = 2). His copy of a complex figure was performed as an exceptionally low score for his age (1.5; discontinued at 10 minutes) and notable for a disorganized, piecemeal approach.     Mood  On the Patient Health Questionnaire-9, a self report measure of depressive symptomatology, he obtained a score of 1, placing him in the range of minimal depression. He denied suicidal ideation.    On the Generalized Anxiety Disorder-7, a self-report measure of anxiety, he obtained a score of 0, placing him in the range of minimal anxiety.      EVALUATION SERVICES AND TIME:   A clinical interview/neurobehavioral status examination was conducted with the patient and documented. I thoroughly reviewed the medical record, selected the neuropsychological test battery, provided supervision to the individual who administered and scored the neuropsychological test battery, interpreted/integrated patient data and test results, engaged in clinical decision making, treatment planning, report writing/preparation and provided and documented interactive feedback of test results on the day of testing. A trained examiner/technician directly administered and scored 2+ neuropsychological tests. Please see below for a breakdown of time spent and the associated codes billed for these services.     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 Forrester has a history of progressive language disturbance and was referred for an evaluation of Speech disturbance, unspecified type, Word finding difficulty. Feedback of results was provided on the day of testing.       Loyda Bustamante, PhD, LP, ABPP  Clinical Neuropsychologist, [id number]  Board Certified in Clinical Neuropsychology    [facility] Clinic[facility] [address], [address]  [place] [address]  Phone:  [phone]    The patient was seen for a neuropsychological evaluation for the purposes of diagnostic  clarification and treatment planning. 190 minutes of face-to-face testing were provided by this writer. The patient was cooperative with testing. No concerns were brought to my attention. Please see Dr. Bustamante's report for a detailed description of the charges and interpretation and integration of the findings.      The patient was seen for a neuropsychological evaluation for the purposes of diagnostic clarification and treatment planning. 55 minutes were spent scoring and compiling test results by this writer. Please see Dr. Bustamante's report for a detailed description of the charges and interpretation and integration of the findings.      Again, thank you for allowing me to participate in the care of your patient.        Sincerely,        Loyda Bustamante, PhD LP

## 2023-04-14 NOTE — PROGRESS NOTES
NEUROPSYCHOLOGY CONSULT  [facility] Neurology [facility]    NAME: Robbin Forrester    YOB: 1965   AGE: 57  EDU: 13  DATE OF EVALUATION: 4/14/2023    REASON FOR REFERRAL:  Mr. Forrester is a 57 year old, left-handed, White male presenting with family concerns about progressive language disturbance. He was referred for a neurocognitive evaluation by his neurologist, Dr. Gurinder Jackson from [facility]rology [facility] to assist with differential diagnosis and care planning.     DIAGNOSTIC SUMMARY:  Due to the current COVID-19 pandemic that limits contact during in-person clinical visits, the testing portion of this assessment was conducted using face-to-face methods with PPE worn by the examiner and a face-mask for the patient. The standard administration of these tests involves in-person, direct face-to-face methods. The full impact of applying non-standard administration methods with PPE is not fully appreciated at this time. The diagnostic conclusions and recommendations provided in this report are being advanced with caution.    With these limitations in mind, results of testing indicate that Mr. Forrester is a gentleman of estimated average premorbid intellectual functioning whose performance is notable only for intact receptive picture vocabulary in the context of global cognitive impairment. Specifically, he demonstrated generally moderate to severe deficits across measures of basic attention, cognitive efficiency, learning (both verbal and nonverbal), memory (both verbal and nonverbal), language (fund of knowledge, sight word reading, spelling, confrontation naming, semantic fluency, repetition, comprehension), visuospatial reasoning skills, and executive functioning (complex attention, working memory, phonemic fluency, visuospatial planning and organization). Finally, on a self-report questionnaire, he denied any significant symptoms of depression or anxiety.     I was able to  share the above results along with my impressions and recommendations (see below) with Mr. Forrester and his family (wife Shira, son Katharine) on the day of testing. I provided the opportunity for them to ask questions about the evaluation and results. At the end of our meeting, they indicated that they understood the results and that I had answered all of their questions. They were encouraged to reach out to me (contact information included in the AVS) should any further questions or concerns arise in the future. (Mr. Forrester provided verbal consent for me to talk to Shira or Katharine if they should call with questions.) I explained that I would send the impressions and recommendations from the report as part of the After Visit Summary (which will be directed to clinic staff to print and send by mail) and that Dr. Gurinder Jackson would be receiving the full report as the consulting provider as would his PCP.     Summary for Providers  ASSESSMENT:    Impairments (mostly moderate to severe) identified across all domains assessed including attention, speed of thinking, language, learning, memory, and executive functioning    Ironically, his strongest and only intact score was on a language task, a measure of single word comprehension     Of note, memory deficits are retrieval based (I.e., he demonstrates some, albeit limited, benefit from cues)    Test scores together with his behavioral presentation, and history of symptoms reported by family (including progressive language decline with more recent memory difficulties) suggest that his presentation very likely represents a Primary Progressive Aphasia (likely semantic variant given impairments in confrontation naming, regularization errors in reading and writing and notable word finding difficulties in conversational speech but intact grammar and fluency)    It is notable, however, that his language deficits are pervasive and impacting even over-learned skills (sight word  reading, speeding reading of simple words) as well as skills that would normally be persevered in Semantic variant PPA (repetition, comprehension). Unfortunately I suspect that this relates to progression of the condition that has been evident for upwards of 3-4 years now (tanner presented to Neurology in the fall of 2021 with difficulties evident 1-2 years prior).     The global nature of cognitive deficits outside of language (I.e., memory, attention, cognitive efficiency and executive functioning) are also likely explained by progression of PPA, which although initially impacts language primarily, progresses over time to impact all cognitive skills    Of note, concern was raised about third shift work and whether insufficient sleep and disrupted sleep patterns could account for his presentation.  While it is very likely that sleep disruption is exacerbating or exaggerating his difficulties, it is highly unlikely that poor sleep alone can account for this presentation.    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 While this may have been the case to some extent, he was able to see adequately on a number of tasks (including a picture vocabulary task where he demonstrated average performance) and performed poorly on many tasks without a visual component. Reduced vision alone cannot account for his deficits on testing.    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 I'm very surprised that he has been able to continue work this long without mistakes and I suspect there may be errors that are not being detected    A referral to Social Work/ Care Management would be appropriate to assist with transition from work and general support with changes moving forward. As his PCP is in the hospitals network, he will need to pursue services there. If   Gurinder Jackson is aware of another avenue into Care Management in the [facility] system, I would happily defer to her to facilitate that process.     Strongly recommend that he discontinue driving (given impairments in memory, speeded processing and executive functioning). He could consider a driving evaluation but as his cognition will continue to decline over time, it is unlikely he would be able to continue driving for very long.     It is recommended that his family continue to manage daily activities (I.e., cooking, finances, driving)    A trial of speech therapy may be beneficial to maximize language functioning.     Given the severity of his cognitive deficits, no future testing is recommended at this time unless questions of diagnosis or dispositional planning arise     While I think it highly unlikely that sleep alone could account for his presentation, if significant cognitive improvement is evident following a return to normal sleep patterns, re-evaluation is recommended (no need to wait for 1 year, just as soon as significant improvements have been present for at least a month) to determine if PPA continues to represent the most appropriate diagnosis    FEEDBACK:  Mr. Forrester received the results of this evaluation on the day of testing.     Thank you for allowing me to participate in Mr. Forrester's care.  Please contact me with any questions regarding the content of this report.      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 Forrester retire and apply  for disability. Given his severe cognitive deficits he will not be able to consistently perform his current job duties or any duties associated with traditional gainful employment.     Given his profile characterized by executive dysfunction, slowed processing speed and memory impairments, it is strongly recommended that Mr. Forrester stop driving. If he would like to continue driving, a formal driving evaluation is strongly recommended. Possible options for formal driving evaluations would be: [facility] ([phone]), [facility] ([phone]) or any option recommended by his physician. However, given that his condition is progressive, it is unlikely he would be able to continue driving for very long.     A referral to Social Work/ Care Management would be appropriate to assist with transition from work and general support with changes moving forward. As his PCP is in the [facility] network, he will need to pursue services there. If Dr. Gurinder Jackson is aware of another avenue into Care Management in the [facility] system, I would happily defer to her to facilitate that process.     Mr. Forrester would benefit from help in his daily activities at home particularly in terms of managing finances. This would also be true for any medications that may be prescribed in the future.     It is strongly recommended that a family member or close friend accompany Mr. Forrester to all appointments to ensure that accurate information is given to providers and instructions are followed. It will be helpful to present the information in brief, repeated segments and have him repeat back the information in his own words to ensure understanding. But ultimately, his family should be in charge of following through with any recommendations.     It is recommended that Mr. Forrester not venture into the community independently.  He is at high risk for being taken advantage of.  In addition, he is at risk for getting lost and turned around and may  have significant problems finding his way home or even being able to express his home address to ask for help getting home.     If not already done, completion of paperwork for advance directives and assignment of healthcare and financial power of  should be considered at this time.    To the extent that his family can continue to provide support and assistance, it seems that the current living situation is working.  However, as more support is needed in the future or if his family is unable to provide that support, Mr. Forrester would benefit from increased supervision and support in his daily life so as to help his manage daily tasks such as cooking and cleaning as well as keeping track of medications and to ensure his personal safety. An assisted living facility would be ideal so as to allow his some independence while also providing a structured and supportive environment.    Family Resources    Mr. Forrester and his family may benefit from learning about Semantic Variant PPA (language variant FTD). The Association for Frontotemporal Degeneration (AFTD) has a lot of information ([phone]) or online at www.theaftd.org/understandingftd/disorders/semantic-dementia  Physical and Emotional Health    It is strongly recommended that as soon as he is able to stop working, that Mr. Forrester develop a regular sleep pattern including evaluating his current sleep hygiene behaviors and if need be, make changes to help facilitate sleep. Such changes might include avoiding watching television or reading in bed before attempting to fall asleep (instead, he should perform these activities outside of the bedroom), avoid eating, consuming caffeine or exercising several hours before trying to fall asleep, avoid napping during the day, and establishing a regular bedtime and wake-up time.  Relaxation exercises (e.g., listening to soothing music, deep breathing, progressive muscle relaxation) right before going to bed might also help.  If he tends to have difficulty returning to sleep in the night or in falling asleep due to worrying, cognitive strategies, which could be discussed with a therapist may also be useful. If these techniques do not improve his sleep, he may wish to consult his physician to assess for any underlying physical issues that may be impacting his sleep and to determine if a formal sleep study is warranted.    Mr. Forrester does not appear to be struggling with any significant emotional symptoms. Behavioral activation techniques such as regular exercise (under the guidance of his physician), recreational activities and regular social interaction would likely be effective in helping Mr. Forrester to continue to maintain his mood.   Memory and Organization    Mr. Kraus may benefit from a trial of speech therapy (I.e., cognitive rehabilitation) to determine if there are techniques or skills he can use to maximize his language abilities and also support his memory. We did discuss this during the feedback session and I shared that I would be happy to enter a referral through [facility], or they could discuss this in their upcoming appointment with Dr. Gurinder Jackson. They could also pursue speech therapy again through [facility].     Mr. Forrester is encouraged to continue to engage in stimulating activities, (i.e., reading, card games, puzzles) to keep him cognitively active.     In his daily life, Mr. Forrester may find it helpful to post reminder notes around the house, make lists, and carry a small calendar so that he can feel more comfortable and confident in his ability to remember information. A daily planner could also be used as a memory book where important information is recorded and organized for future reference.     If needed, Mr. Forrester could also create a system to establish set locations for certain items (i.e., keys) such that he always knows where to put them upon entering the house and where to look when he needs them. If he would  like to keep certain items out of sight (i.e., a wallet), he could set up a specific hidden place to keep items and use that same place so as to ensure he can find the required item when needed.     Mr. Forrester exhibits memory impairments but does show some benefit from cues, thus he will do best when provided with reminders to facilitate retrieval of important information.     When required to learn new information, Mr. Forrester performed best when information was repeated. When medical procedures or instructions are being explained to him, it might be helpful to present the information in brief, repeated segments and have him repeat back the information in his own words to ensure understanding.     Mr. Forrester will do best in an environment where a lot of routines are established so that he can follow a regular pattern for his daily or weekly routines.     Mr. Forrester demonstrates intact basic attention but significant memory impairments, thus, he should not be given instructions for tasks any more than a few minutes in the future.    Mr. Forrester should be aware that he may not be able to process information as quickly and efficiently as he once could. Thus he should allow himself extra time to complete tasks and not try and work under extreme time constraints.   Follow-up    Given the severity of Mr. Forrester's cognitive deficits, no future testing is recommended at this time unless questions of diagnosis or dispositional planning arise     While I think it highly unlikely that sleep alone could account for his presentation, if significant cognitive improvement is evident following a return to normal sleep patterns, re-evaluation is recommended (no need to wait for 1 year, just as soon as significant improvements have been present for at least a month).    --------------------------------EXTENDED REPORT--------------------------------  Verbal consent for neuropsychological testing was received following the provision of information  "about the nature of the evaluation, and the opportunity to ask questions.     HISTORY OF PRESENTING PROBLEM:    Relevant History  Mr. Forrester was initially seen for Neurology consult by Dr. Gurinder Jackson on 10/28/21 \"Mr. Forrester is a pleasant 56-year-old man with little medical history, here for evaluation regarding speech difficulties.  On exam, he has difficulty with expressive language and word-finding difficulties.  He also seems a little bit slow to answer my questions, though this could just be due to his speech difficulties. He scored 17/30 on the Williams today, with difficulties in multiple tasks including visuospatial, recall, attention and significantly: language.     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  Of course, he does have this history of a change in his schedule with work, which could be contributing.  I explained to Robbin that some speech conditions can be rehabilitated, really like to make sure you understand what it is that we are dealing with at this point.  He is in agreement.  I would like to get an MRI of his brain to rule out structural cause and then send him on for neuropsych evaluation.  We will see him back thereafter.  Robbin and his wife expressed understanding and agreement with the plan.\"     Neuropsychological testing was initially scheduled in March 2022 but Mr. Forrester canceled this appointment.  It was rescheduled for May 2022 and again he canceled this.  It was ultimately rescheduled for today's appointment.  His family later shared that he had canceled the first 2 appointments as he did not feel there was sufficient concern to go through the evaluation.    Current Interview  Mr. Forrester was accompanied by his wife Shira and son Katharine and was a variable historian. Together they provided the following information.     At the present time, Mr. Forrester reports that he has not noticed any significant difficulties with his thinking skills including memory, " "attention or language.  When asked if he has any difficulty getting his words out, he denied any problems and when asked if he had difficulties coming up with the right words he stated \"sometimes.\"  But in general said \"no, not really,\" when asked if there are any concerns about his language.      When we were specifically discussing language concerns, Mr. Forrester responded with \"it depends,\" and then went on to describe his job. He stated, \"I am a 3rd...3rd... what is it...\" attempting to describe how he works 3rd shift (10pm -6am). He then went on to describe his job, \"its great to get all your... oh boy....... I can get all.... my work done in 4 hours.\" He described how its easy for him to do his job and completes all his work very quickly, but then he just walks around (and occasionally falls asleep) for the remainder of his shift. When I asked him to describe his job to me, \"uh... what do I..... I have to show you.\" He then went on to say \"They have to go under... I'm always under a light...\" His family later shared that he is a  and does a lot of precision work. He was able to state that he has been working 3rd shift for the last 5 years but has been with the company for 17 years (could not spontaneously name, but recognized [employer]). Other than sometimes falling asleep at work, he denied any problems at his job and added that \"when I'm working, people come to me, I'm a... what do you call it.... if there's people who need... 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     I asked Mr. Forrester why we are wearing masks and he responded \"so we don't get... don't share... \" He was not aware of any major illnesses or viruses or sicknesses that have been going around.  He stated that he recognized the term COVID but could not tell me what COVID is.  He " denied recognizing the term coronavirus.    Shira shared that they had gone to Neurology in 2021 and had been noticing problems with his language for some time before that.  She noted that she has been observing word loss and difficulty getting his words out and finding the right words.  She added that more recently she has been noticing trouble with recall.  Both she and Katharine were surprised about him not recognizing COVID.  She did share that on [facility] he did not recognize the name of a restaurant where they have been many times.  Before that she had noticed him being forgetful of places but had attributed it to him not having been there recently.  She noted that he previously had excellent memory and used to be able to do things like recall four different credit card numbers along with expiration dates and the 3 digit codes.  She added that she feels that his word loss has been getting worse over time.  Katharine agreed with this information and added that his father had always had a great vocabulary so this change is pretty striking.  Shira also shared that there has been some difficulties at work as he has been having some unexcused absences.  She notes that her  says he has filled out forms asking for the time off but work denies receiving them and Mr. Forrester says that they have been lost or that the company lost them.  She added that they now need to do FMLA to make sure all the days are covered.  She was not aware of the number of absences he was taking.  When asked today why he had taken these times, Mr. Forrester said he just wanted to take time off.    Shira also shared that he was referred for speech therapy by his primary care provider but when they went to the appointment the therapist was not sure how best to help as there has not been any formal diagnosis.    With regard to the activities of daily living, Mr. Forrester reported that his wife does most of the cooking and she agreed but added that he used  "to do some, but does not do much now.  Shira shared and Mr. Forrester agreed that he does a lot of the cleaning in the household but Shira indicates that he is not cleaning and organizing at the same level he once did.  She acknowledges that she used to help more and that is part of the problem but not all of it.  Shira indicated that she took over management of the family finances about 7 to 8 months ago, as there were \"quite a few,\" missed payments.  Mr. Forrester does not take any prescription meds.  He continues to drive with no recent tickets, accidents or incidents of becoming lost.    MEDICAL HISTORY:  Mr. Forrester's family shared that he has always been in good physical health and does not take any prescription medications.     Mr. Forrester denied any history of stroke, seizure or head injury with loss of consciousness and his family agreed. Shira shared that her  has not tested positive for COVID or experienced an illness concerning for COVID.    Mr. 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  Otherwise he denied any significant sensory changes or disturbance in appetite. Shira shared that she is actually not sure how much he is eating as they are on different schedules.  She notes that he will eat cereal but while she makes food and has leftovers for him in the fridge, she is not sure if he actually eats them.  Mr. Forrester says he does.      With regard to sleep, Mr. Forrester acknowledges that this is a challenge noting that he has been working third shift for the last 5 years and it is hard for him to get quality sleep.  Shira added that he is not very good with sleep hygiene and also on his days off tends to revert to a more typical schedule where he is up during the day and sleeps at night.  Their best estimate is that he generally comes home from " "work, walks the dogs, watches some TV and goes to sleep around noon and only sleeps about 3 to 4 hours before getting up.  He might sleep 1 or 2 hours later in the evening before going to work at 10 PM.  Mr. Forrester states that he is often tired and acknowledges that he sometimes falls asleep at work.  Of note, Mr. Forrester did not work last night and he stated that he feels well rested this morning as he slept overnight.    With regard to exercise, Mr. Forrester acknowledged that he is not exercising regularly now and he would like to do that again.  He noted that his main form of exercise is walking the dogs.  His family agreed.  Shira shared that several years ago he was going to the gym several times a week and feels that some of his weight loss may be due to to muscle loss.    Diagnostic studies:  An MRI of the brain from 12/7/2021 revealed, \"The exam is somewhat limited by motion-related artifacts. No definite acute or subacute infarct. No mass, acute hemorrhage, or extra-axial fluid collections. Scattered nonspecific T2/FLAIR hyperintensities within the cerebral white matter most consistent with mild chronic microvascular ischemic change. Mild to moderate generalized cerebral atrophy. No hydrocephalus. Normal position of the cerebellar tonsils. There is a small probable developmental venous anomaly in the left posterior parietal region (series 10, images 16-18). There are also may be a mildly prominent transmantle venous structure along the anterior frontal lobe (series 10, image 13; series 11, image 6). Otherwise, no abnormal intracranial postcontrast   enhancement seen.\"    No past surgical history on file.    Current medications include (per medical record):   Current Outpatient Medications:      MULTIPLE VITAMIN PO, Take by mouth daily, Disp: , Rfl: .    FAMILY HISTORY:   Family medical history is significant for:   Family History   Problem Relation Age of Onset     Cancer Mother    Other than Ms. Forrester's paternal " "grandmother who developed Parkinson's in her 90s, they are unaware of any other neurologic history in the family.    PSYCHIATRIC AND SUBSTANCE USE HISTORY:  With regard to his psychiatric history, Mr. Forrester denied a history of past psychiatric conditions or mental health treatment and his family agreed.  At the current time, he described his mood as good.  He denied any ongoing symptoms of depression or anxiety.  He denied any suicidal ideation, plan or intent or hallucinations or delusions.  His family agreed that he is generally in a good mood and denied significant history of anxiety or depression.  Shira did share that he had a tendency towards being more \"grouchy\" with her in the past but this has largely disappeared in the last few years.    With regard to substance use, Mr. Forrester indicated that he does not drink very regularly and his family agreed noting that he might have a couple drinks a year.  He denied any history of problematic alcohol use and his family agreed.  He denied history of tobacco or recreational drug use and his family agreed.    SOCIAL HISTORY:  Mr. Forrester was born and raised in [facility]. He was unaware of any complications in his mother's pregnancy with him or in his birth, or delays in reaching developmental milestones.  He reported that he is the only one in his immediately family who is left-handed but both grandfathers were left-handed. He denied a history of early learning or attention difficulties, individualized instruction, or grade repetition. He described himself as an average student in high school, earning mostly Bs and Cs.  He did state that he tended to do better in more hands-on classes such as mechanical work.  He struggled more in history and English classes.  He graduated on time with his class and estimated that he graduated towards the middle of his class.    Mr. 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  He initially worked for a number of companies but has most recently been at [employer] for 17 years working as a .  He is currently working the night shift and has been for the last 5 years.  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    Mr. Karus was able to share that he has 1 son (Katharine) from a previous relationship (no grandchildren) and estimated that he has been  to his current wife Shira for 13 or 14 years (actually 11).    BEHAVIORAL OBSERVATIONS:   Mr. Forrester arrived 30 minutes early and accompanied by his wife Shira and son Katharine to today's appointment. He was appropriately dressed and groomed. He was alert and engaged during the interview. Gait was unremarkable. His mood was euthmyic and his affect was appropriately reactive. Rapport was easily established and eye contact was unremarkable. He was pleasant and cooperative. Prosody, and content of speech were grossly normal. However, significant word finding difficulties (but without paraphasic or grammatical errors) were evident throughout, leading to some hesitancy in his speech as he paused to take time to think or try and explain the word he was looking for. There was no evidence of a ricardo thought disorder; no hallucinations or delusions were apparent. Judgment and insight appeared limited.       Mr. Forrester appeared adequately motivated and engaged easily in the testing component of the evaluation.He attempted all tasks presented to him and worked at a slow but steady pace. He did not appear overly frustrated by difficult or challenging tasks and responded appropriately to encouragement from the examiner. As during the interview, significant word finding difficulties were evident and he often took an extended period of time to  indicate an answer. He denied being color blind but clearly struggled on tasks with a color component (Tokens, Color Naming). His family later shared that he has always had some difficulties discerning colors but not necessarily a classic red/green color blindness. Instructions often required repetition as he would indicate that he forgot what he was supposed to do. Otherwise, no other significant barriers to testing were observed and the following is judged to be a valid representation of Mr. Forrester's current cognitive strengths and weaknesses.    LIMITATIONS:  Due to circumstances that limit contact during in-person clinical visits, this assessment was conducted using face-to-face testing with the examiner wearing [employer] designated PPE and the patient wearing a face mask. The standard administration of these procedures involves in-person, face-to-face methods without PPE. The impact of applying non-standard administration methods has been evaluated only in part by scientific research. While every effort was made to simulate standard assessment practices, the diagnostic conclusions and recommendations for treatment provided in this report are being advanced with these limitations in mind.    TESTS ADMINISTERED:   BDAE (Complex Ideational Material-CIM, Wilder Naming Test- BNT), Brief Visuospatial Memory Test - R Form 1 (BVMT-R), DKEFS (Color Word Interference- Word Reading only, Verbal Fluency)Generalized Anxiety Disorder-7 (TC-7), Patient Health Questionnaire (PHQ-9), Moss Verbal Learning Test - R Form 1 (HVLT-R), CUELLAR (Sentence Repetition Form 1, Tokens- Form B), Peabody Picture Vocabulary Test, Fourth Edition (PPVT-4), Dakota-Osterrieth Complex Figure Test, copy only (RCFT), Trail Making Test (TMT), WAIS-IV (Information, Block Design, Digit Span), WRAT-4 Blue (Word Reading, Spelling), and WMS-III Information and Orientation.    [facility] norms were used for BDAE-CIM, BNT, TMT  (Raw scores in  parentheses)    DESCRIPTIVE PERFORMANCE KEY:    Labels for tests with Normal Distributions  Score Label Standard Score %ile Rank   Exceptionally high score  > 130 > 98   Above average score 120-129 91-97   High average score 110-119 75-90   Average score  25-74   Low average score 80-89 9-24   Below average score 70-79 2-8   Exceptionally low score < 70 < 2     Labels for tests with Non-Normal Distributions  Score Label %ile Rank   Within normal expectations/ limits score (WNL) > 24   Low average score 9-24   Below average score 2-8   Exceptionally low score < 2     The following test results utilize score labels as adapted from Dalton Carlisle, Adrian Naylor, Leona Wyman, ELLEN Wade, Menyd Ross, Josue Fischer, Nigel Lindquist & Conference Participants (2020): American Academy of Clinical Neuropsychology consensus conference statement on uniform labeling of performance test scores, The Clinical Neuropsychologist, DOI: 10.1080/[email or web address]    All scores contain some measure of error; scores are reported here as they are obtained by the individual (without reference to the range of error). These are meant as labels and not interpretation of performance. While other relevant comments regarding task performance are provided below, please see the Assessment, Impressions and Diagnostic Summary sections of this report for interpretation of the scores and the cognitive profile as a whole, including what does and does not constitute impairment.    OPTIMAL PREMORBID INTELLECT:  Optimal premorbid intellectual abilities were estimated as falling in the average range based on Mr. Forrester's educational and occupational histories.    SUMMARY OF TEST RESULTS:     Orientation, Attention and Processing Speed  Mental status exam was measured as a below average score for his age (10). He was oriented to personal information, city, year, month and day of the week and was able to correctly  "name the current president. He knew he was at a hospital but could not say which one. He stated it was the 15th (actually 14th). He could not name the most recent president and was almost an hour off when estimating the time.     Performance on a measure of basic attention and working memory was assessed as an exceptionally low score (12). This reflected a below average score for basic attention skills (LDF = 4) and working memory scores that ranged from an exceptionally low score (LDS = 2) to a low average score (LDB = 3).    A simple sequencing task (114\" ) was assessed as an exceptionally low score. A speeded word reading task (48\") was also assessed as an exceptionally low score.     Language  Sight word reading skills (49) were assessed as a below average score. Spelling skills (26) assessed as a below average score. Fund of general knowledge (4) was assessed as a below average score. His performance on a measure of semantic fluency was measured as an exceptionally low score (19). Confrontation naming was measured as an exceptionally low score (30). Comprehension of increasingly complex commands was measured as an exceptionally low score (26). Repetition of sentences was assessed as an exceptionally low score (4). On the Complex Ideational Material, he also earned an exceptionally low score (6). In contrast, receptive single word comprehension was measured as an average score (212).     Visuospatial Skills  Performance on a block construction task (4) resulted in an exceptionally low score. He had significant difficulty copying a series of 6 geometric figures (3). While he was able to generally recreate the overall gestalt of each figure, they were drawn somewhat carelessly and with poor attention to detail.     Learning and Memory  He was administered a measure of rote auditory verbal list learning that required him to learn a series of 12 words over three trials and retain and recall them over a delay. His " initial rate of learning (3,5,6) reflected an exceptionally low score. He retained and recalled no words (0% retention) after the delay for an exceptionally low score for delayed recall. Recognition memory was measured as a below average score as he correctly identified 9 of the original words and made 1 false positive error.     Immediate nonverbal memory (0,0,0) for a series of 6 geometric figures was measured as an exceptionally low score while delayed recall of these figures (no details) resulted in an exceptionally low score. Recognition memory was measured as an exceptionally low score as he correctly identified 5 of the original figures and made 3 false positive errors.    Executive Functioning  Working memory skills ranged from an exceptionally low score (LDS = 2) to a low average score (LDB = 3). A complex sequencing and set shifting task was discontinued at 6 minutes (at C) and notable for 4 errors by that point. His performance on a measure of phonemic fluency resulted in an exceptionally low score (13). Semantic set shifting skills were also measured as an exceptionally low score (Category Switching Accuracy = 2). His copy of a complex figure was performed as an exceptionally low score for his age (1.5; discontinued at 10 minutes) and notable for a disorganized, piecemeal approach.     Mood  On the Patient Health Questionnaire-9, a self report measure of depressive symptomatology, he obtained a score of 1, placing him in the range of minimal depression. He denied suicidal ideation.    On the Generalized Anxiety Disorder-7, a self-report measure of anxiety, he obtained a score of 0, placing him in the range of minimal anxiety.     EVALUATION SERVICES AND TIME:   A clinical interview/neurobehavioral status examination was conducted with the patient and documented. I thoroughly reviewed the medical record, selected the neuropsychological test battery, provided supervision to the individual who administered  and scored the neuropsychological test battery, interpreted/integrated patient data and test results, engaged in clinical decision making, treatment planning, report writing/preparation and provided and documented interactive feedback of test results on the day of testing. A trained examiner/technician directly administered and scored 2+ neuropsychological tests. Please see below for a breakdown of time spent and the associated codes billed for these services.     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 Forrester has a history of progressive language disturbance and was referred for an evaluation of Speech disturbance, unspecified type, Word finding difficulty. Feedback of results was provided on the day of testing.       Loyda Bustamante, PhD, LP, ABPP  Clinical Neuropsychologist, [id number]  Board Certified in Clinical Neuropsychology    [facility] [place] [address], [address]  [place] [address]  Phone:  [phone]

## 2023-04-14 NOTE — LETTER
4/14/2023         RE: Robbin Forrester  [address] [address]        Dear Colleague,    Thank you for referring your patient, Robbin Forrester, to the [facility] [place] [facility] [place] Please see a copy of my visit note below.    NEUROPSYCHOLOGY CONSULT  [facility]    NAME: Robbin Forrester    YOB: 1965   AGE: 57  EDU: 13  DATE OF EVALUATION: 4/14/2023    REASON FOR REFERRAL:  Mr. Forrester is a 57 year old, left-handed, White male presenting with family concerns about progressive language disturbance. He was referred for a neurocognitive evaluation by his neurologist, Dr. Gurinder Jackson from [facility] to assist with differential diagnosis and care planning.     DIAGNOSTIC SUMMARY:  Due to the current COVID-19 pandemic that limits contact during in-person clinical visits, the testing portion of this assessment was conducted using face-to-face methods with PPE worn by the examiner and a face-mask for the patient. The standard administration of these tests involves in-person, direct face-to-face methods. The full impact of applying non-standard administration methods with PPE is not fully appreciated at this time. The diagnostic conclusions and recommendations provided in this report are being advanced with caution.    With these limitations in mind, results of testing indicate that Mr. Forrester is a gentleman of estimated average premorbid intellectual functioning whose performance is notable only for intact receptive picture vocabulary in the context of global cognitive impairment. Specifically, he demonstrated generally moderate to severe deficits across measures of basic attention, cognitive efficiency, learning (both verbal and nonverbal), memory (both verbal and nonverbal), language (fund of knowledge, sight word reading, spelling, confrontation naming, semantic fluency, repetition, comprehension), visuospatial  reasoning skills, and executive functioning (complex attention, working memory, phonemic fluency, visuospatial planning and organization). Finally, on a self-report questionnaire, he denied any significant symptoms of depression or anxiety.     I was able to share the above results along with my impressions and recommendations (see below) with Mr. Forrester and his family (wife Shira, son Katharine) on the day of testing. I provided the opportunity for them to ask questions about the evaluation and results. At the end of our meeting, they indicated that they understood the results and that I had answered all of their questions. They were encouraged to reach out to me (contact information included in the AVS) should any further questions or concerns arise in the future. (Mr. Forrester provided verbal consent for me to talk to Shira or Katharine if they should call with questions.) I explained that I would send the impressions and recommendations from the report as part of the After Visit Summary (which will be directed to clinic staff to print and send by mail) and that Dr. Gurinder Jackson would be receiving the full report as the consulting provider as would his PCP.     Summary for Providers  ASSESSMENT:  Impairments (mostly moderate to severe) identified across all domains assessed including attention, speed of thinking, language, learning, memory, and executive functioning  Ironically, his strongest and only intact score was on a language task, a measure of single word comprehension   Of note, memory deficits are retrieval based (I.e., he demonstrates some, albeit limited, benefit from cues)  Test scores together with his behavioral presentation, and history of symptoms reported by family (including progressive language decline with more recent memory difficulties) suggest that his presentation very likely represents a Primary Progressive Aphasia (likely semantic variant given impairments in confrontation naming,  regularization errors in reading and writing and notable word finding difficulties in conversational speech but intact grammar and fluency)  It is notable, however, that his language deficits are pervasive and impacting even over-learned skills (sight word reading, speeding reading of simple words) as well as skills that would normally be persevered in Semantic variant PPA (repetition, comprehension). Unfortunately I suspect that this relates to progression of the condition that has been evident for upwards of 3-4 years now (initallly presented to Neurology in the fall of 2021 with difficulties evident 1-2 years prior).   The global nature of cognitive deficits outside of language (I.e., memory, attention, cognitive efficiency and executive functioning) are also likely explained by progression of PPA, which although initially impacts language primarily, progresses over time to impact all cognitive skills  Of note, concern was raised about third shift work and whether insufficient sleep and disrupted sleep patterns could account for his presentation.  While it is very likely that sleep disruption is exacerbating or exaggerating his difficulties, it is highly unlikely that poor sleep alone can account for this presentation.  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 While this may have been the case to some extent, he was able to see adequately on a number of tasks (including a picture vocabulary task where he demonstrated average performance) and performed poorly on many tasks without a visual component. Reduced vision alone cannot account for his deficits on testing.  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 I'm very surprised that he has been able to continue work this long without mistakes and I suspect there may be errors that are not  being detected  A referral to Social Work/ Care Management would be appropriate to assist with transition from work and general support with changes moving forward. As his PCP is in the \[facility]\"" network, he will need to pursue services there. If Dr. Gurinder Jackson is aware of another avenue into Care Management in the [facility] system, I would happily defer to her to facilitate that process.   Strongly recommend that he discontinue driving (given impairments in memory, speeded processing and executive functioning). He could consider a driving evaluation but as his cognition will continue to decline over time, it is unlikely he would be able to continue driving for very long.   It is recommended that his family continue to manage daily activities (I.e., cooking, finances, driving)  A trial of speech therapy may be beneficial to maximize language functioning.   Given the severity of his cognitive deficits, no future testing is recommended at this time unless questions of diagnosis or dispositional planning arise   While I think it highly unlikely that sleep alone could account for his presentation, if significant cognitive improvement is evident following a return to normal sleep patterns, re-evaluation is recommended (no need to wait for 1 year, just as soon as significant improvements have been present for at least a month) to determine if PPA continues to represent the most appropriate diagnosis    FEEDBACK:  Mr. Forrester received the results of this evaluation on the day of testing.     Thank you for allowing me to participate in Mr. Forrester's care.  Please contact me with any questions regarding the content of this report.      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 Forrester retire and apply for disability. Given his severe cognitive deficits he will not be able to consistently perform his current job duties or any duties associated with traditional gainful employment.   Given his profile characterized by executive dysfunction, slowed processing speed and memory impairments, it is strongly recommended that Mr. Forrester stop driving. If he would like to continue driving, a formal driving evaluation is strongly recommended. Possible options for formal driving evaluations would be: [facility] ([phone]), [facility] ([phone]) or any option recommended by his physician. However, given that his condition is progressive, it is unlikely he would be able to continue driving for very long.   A referral to Social Work/ Care Management would be appropriate to assist with transition from work and general support with changes moving forward. As his PCP is in the [facility] network, he will need to pursue services there. If Dr. Gurinder Jackson is aware of another avenue into Care Management in the [facility] system, I would happily defer to her to facilitate that process.   Mr. Forrester would benefit from help in his daily activities at home particularly in terms of managing finances. This would also be true for any medications that may be prescribed in the future.   It is strongly recommended that a family member or close friend accompany Mr. Forrester to all appointments to ensure that accurate information is given to providers and instructions are followed. It will be helpful to present the information in brief, repeated segments and have him repeat back the information in his own words to ensure understanding. But ultimately, his family should be in charge of following  through with any recommendations.   It is recommended that Mr. Forrester not venture into the community independently.  He is at high risk for being taken advantage of.  In addition, he is at risk for getting lost and turned around and may have significant problems finding his way home or even being able to express his home address to ask for help getting home.   If not already done, completion of paperwork for advance directives and assignment of healthcare and financial power of  should be considered at this time.  To the extent that his family can continue to provide support and assistance, it seems that the current living situation is working.  However, as more support is needed in the future or if his family is unable to provide that support, Mr. Forrester would benefit from increased supervision and support in his daily life so as to help his manage daily tasks such as cooking and cleaning as well as keeping track of medications and to ensure his personal safety. An assisted living facility would be ideal so as to allow his some independence while also providing a structured and supportive environment.    Family Resources  Mr. Forrester and his family may benefit from learning about Semantic Variant PPA (language variant FTD). The Association for Frontotemporal Degeneration (AFTD) has a lot of information ([phone]) or online at www.theaftd.org/understandingftd/disorders/semantic-dementia  Physical and Emotional Health  It is strongly recommended that as soon as he is able to stop working, that Mr. Forrester develop a regular sleep pattern including evaluating his current sleep hygiene behaviors and if need be, make changes to help facilitate sleep. Such changes might include avoiding watching television or reading in bed before attempting to fall asleep (instead, he should perform these activities outside of the bedroom), avoid eating, consuming caffeine or exercising several hours before trying to fall asleep, avoid  napping during the day, and establishing a regular bedtime and wake-up time.  Relaxation exercises (e.g., listening to soothing music, deep breathing, progressive muscle relaxation) right before going to bed might also help. If he tends to have difficulty returning to sleep in the night or in falling asleep due to worrying, cognitive strategies, which could be discussed with a therapist may also be useful. If these techniques do not improve his sleep, he may wish to consult his physician to assess for any underlying physical issues that may be impacting his sleep and to determine if a formal sleep study is warranted.  Mr. Forrester does not appear to be struggling with any significant emotional symptoms. Behavioral activation techniques such as regular exercise (under the guidance of his physician), recreational activities and regular social interaction would likely be effective in helping Mr. Forrester to continue to maintain his mood.   Memory and Organization  Mr. Kraus may benefit from a trial of speech therapy (I.e., cognitive rehabilitation) to determine if there are techniques or skills he can use to maximize his language abilities and also support his memory. We did discuss this during the feedback session and I shared that I would be happy to enter a referral through [facility], or they could discuss this in their upcoming appointment with Dr. Gurinder Jackson. They could also pursue speech therapy again through [facility].   Mr. Forrester is encouraged to continue to engage in stimulating activities, (i.e., reading, card games, puzzles) to keep him cognitively active.   In his daily life, Mr. Forrester may find it helpful to post reminder notes around the house, make lists, and carry a small calendar so that he can feel more comfortable and confident in his ability to remember information. A daily planner could also be used as a memory book where important information is recorded and organized for future reference.   If  needed, Mr. Forrester could also create a system to establish set locations for certain items (i.e., keys) such that he always knows where to put them upon entering the house and where to look when he needs them. If he would like to keep certain items out of sight (i.e., a wallet), he could set up a specific hidden place to keep items and use that same place so as to ensure he can find the required item when needed.   Mr. Forrester exhibits memory impairments but does show some benefit from cues, thus he will do best when provided with reminders to facilitate retrieval of important information.   When required to learn new information, Mr. Forrester performed best when information was repeated. When medical procedures or instructions are being explained to him, it might be helpful to present the information in brief, repeated segments and have him repeat back the information in his own words to ensure understanding.   Mr. Forrester will do best in an environment where a lot of routines are established so that he can follow a regular pattern for his daily or weekly routines.   Mr. Forrester demonstrates intact basic attention but significant memory impairments, thus, he should not be given instructions for tasks any more than a few minutes in the future.  Mr. Forrester should be aware that he may not be able to process information as quickly and efficiently as he once could. Thus he should allow himself extra time to complete tasks and not try and work under extreme time constraints.   Follow-up  Given the severity of Mr. Forrester's cognitive deficits, no future testing is recommended at this time unless questions of diagnosis or dispositional planning arise   While I think it highly unlikely that sleep alone could account for his presentation, if significant cognitive improvement is evident following a return to normal sleep patterns, re-evaluation is recommended (no need to wait for 1 year, just as soon as significant improvements have been present  "for at least a month).    --------------------------------EXTENDED REPORT--------------------------------  Verbal consent for neuropsychological testing was received following the provision of information about the nature of the evaluation, and the opportunity to ask questions.     HISTORY OF PRESENTING PROBLEM:    Relevant History  Mr. Forrester was initially seen for Neurology consult by Dr. Gurinder Jackson on 10/28/21 \"Mr. Forrester is a pleasant 56-year-old man with little medical history, here for evaluation regarding speech difficulties.  On exam, he has difficulty with expressive language and word-finding difficulties.  He also seems a little bit slow to answer my questions, though this could just be due to his speech difficulties. He scored 17/30 on the [place] today, with difficulties in multiple tasks including visuospatial, recall, attention and significantly: language.     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  Of course, he does have this history of a change in his schedule with work, which could be contributing.  I explained to Robbin that some speech conditions can be rehabilitated, really like to make sure you understand what it is that we are dealing with at this point.  He is in agreement.  I would like to get an MRI of his brain to rule out structural cause and then send him on for neuropsych evaluation.  We will see him back thereafter.  Robbin and his wife expressed understanding and agreement with the plan.\"     Neuropsychological testing was initially scheduled in March 2022 but Mr. Forrester canceled this appointment.  It was rescheduled for May 2022 and again he canceled this.  It was ultimately rescheduled for today's appointment.  His family later shared that he had canceled the first 2 appointments as he did not feel there was sufficient concern to go through the evaluation.    Current Interview  Mr. Forrester was accompanied by his wife Shira and son Katharine and was a " "variable historian. Together they provided the following information.     At the present time, Mr. Forrester reports that he has not noticed any significant difficulties with his thinking skills including memory, attention or language.  When asked if he has any difficulty getting his words out, he denied any problems and when asked if he had difficulties coming up with the right words he stated \"sometimes.\"  But in general said \"no, not really,\" when asked if there are any concerns about his language.      When we were specifically discussing language concerns, Mr. Forrester responded with \"it depends,\" and then went on to describe his job. He stated, \"I am a 3rd...3rd... what is it...\" attempting to describe how he works 3rd shift (10pm -6am). He then went on to describe his job, \"its great to get all your... oh boy....... I can get all.... my work done in 4 hours.\" He described how its easy for him to do his job and completes all his work very quickly, but then he just walks around (and occasionally falls asleep) for the remainder of his shift. When I asked him to describe his job to me, \"uh... what do I..... I have to show you.\" He then went on to say \"They have to go under... I'm always under a light...\" His family later shared that he is a  and does a lot of precision work. He was able to state that he has been working 3rd shift for the last 5 years but has been with the company for 17 years (could not spontaneously name, but recognized [employer]). Other than sometimes falling asleep at work, he denied any problems at his job and added that \"when I'm working, people come to me, I'm a... what do you call it.... if there's people who need... 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     I asked Mr. Forrester why we are wearing masks and he responded \"so we " "don't get... don't share... \" He was not aware of any major illnesses or viruses or sicknesses that have been going around.  He stated that he recognized the term COVID but could not tell me what COVID is.  He denied recognizing the term coronavirus.    Shira shared that they had gone to Neurology in 2021 and had been noticing problems with his language for some time before that.  She noted that she has been observing word loss and difficulty getting his words out and finding the right words.  She added that more recently she has been noticing trouble with recall.  Both she and Katharine were surprised about him not recognizing COVID.  She did share that on [facility] he did not recognize the name of a restaurant where they have been many times.  Before that she had noticed him being forgetful of places but had attributed it to him not having been there recently.  She noted that he previously had excellent memory and used to be able to do things like recall four different credit card numbers along with expiration dates and the 3 digit codes.  She added that she feels that his word loss has been getting worse over time.  Katharine agreed with this information and added that his father had always had a great vocabulary so this change is pretty striking.  Shira also shared that there has been some difficulties at work as he has been having some unexcused absences.  She notes that her  says he has filled out forms asking for the time off but work denies receiving them and Mr. Forrester says that they have been lost or that the company lost them.  She added that they now need to do FMLA to make sure all the days are covered.  She was not aware of the number of absences he was taking.  When asked today why he had taken these times, Mr. Forrester said he just wanted to take time off.    Shira also shared that he was referred for speech therapy by his primary care provider but when they went to the appointment the therapist was not " "sure how best to help as there has not been any formal diagnosis.    With regard to the activities of daily living, Mr. Forrester reported that his wife does most of the cooking and she agreed but added that he used to do some, but does not do much now.  Shira shared and Mr. Forrester agreed that he does a lot of the cleaning in the household but Shira indicates that he is not cleaning and organizing at the same level he once did.  She acknowledges that she used to help more and that is part of the problem but not all of it.  Shira indicated that she took over management of the family finances about 7 to 8 months ago, as there were \"quite a few,\" missed payments.  Mr. Forrester does not take any prescription meds.  He continues to drive with no recent tickets, accidents or incidents of becoming lost.    MEDICAL HISTORY:  Mr. Forrester's family shared that he has always been in good physical health and does not take any prescription medications.     Mr. Forrester denied any history of stroke, seizure or head injury with loss of consciousness and his family agreed. Shira shared that her  has not tested positive for COVID or experienced an illness concerning for COVID.    Mr. 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  Otherwise he denied any significant sensory changes or disturbance in appetite. Shira shared that she is actually not sure how much he is eating as they are on different schedules.  She notes that he will eat cereal but while she makes food and has leftovers for him in the fridge, she is not sure if he actually eats them.  Mr. Forrester says he does.      With regard to sleep, Mr. Forrester acknowledges that this is a challenge noting that he has been working third shift for the last 5 years and it is hard for him to get quality sleep.  Shira added that he is " "not very good with sleep hygiene and also on his days off tends to revert to a more typical schedule where he is up during the day and sleeps at night.  Their best estimate is that he generally comes home from work, walks the dogs, watches some TV and goes to sleep around noon and only sleeps about 3 to 4 hours before getting up.  He might sleep 1 or 2 hours later in the evening before going to work at 10 PM.  Mr. Forrester states that he is often tired and acknowledges that he sometimes falls asleep at work.  Of note, Mr. Forrester did not work last night and he stated that he feels well rested this morning as he slept overnight.    With regard to exercise, Mr. Forrester acknowledged that he is not exercising regularly now and he would like to do that again.  He noted that his main form of exercise is walking the dogs.  His family agreed.  Shira shared that several years ago he was going to the gym several times a week and feels that some of his weight loss may be due to to muscle loss.    Diagnostic studies:  An MRI of the brain from 12/7/2021 revealed, \"The exam is somewhat limited by motion-related artifacts. No definite acute or subacute infarct. No mass, acute hemorrhage, or extra-axial fluid collections. Scattered nonspecific T2/FLAIR hyperintensities within the cerebral white matter most consistent with mild chronic microvascular ischemic change. Mild to moderate generalized cerebral atrophy. No hydrocephalus. Normal position of the cerebellar tonsils. There is a small probable developmental venous anomaly in the left posterior parietal region (series 10, images 16-18). There are also may be a mildly prominent transmantle venous structure along the anterior frontal lobe (series 10, image 13; series 11, image 6). Otherwise, no abnormal intracranial postcontrast   enhancement seen.\"    No past surgical history on file.    Current medications include (per medical record):   Current Outpatient Medications:      MULTIPLE " "VITAMIN PO, Take by mouth daily, Disp: , Rfl: .    FAMILY HISTORY:   Family medical history is significant for:   Family History   Problem Relation Age of Onset     Cancer Mother    Other than Ms. Forrester's paternal grandmother who developed Parkinson's in her 90s, they are unaware of any other neurologic history in the family.    PSYCHIATRIC AND SUBSTANCE USE HISTORY:  With regard to his psychiatric history, Mr. Forrester denied a history of past psychiatric conditions or mental health treatment and his family agreed.  At the current time, he described his mood as good.  He denied any ongoing symptoms of depression or anxiety.  He denied any suicidal ideation, plan or intent or hallucinations or delusions.  His family agreed that he is generally in a good mood and denied significant history of anxiety or depression.  Shira did share that he had a tendency towards being more \"grouchy\" with her in the past but this has largely disappeared in the last few years.    With regard to substance use, Mr. Forrester indicated that he does not drink very regularly and his family agreed noting that he might have a couple drinks a year.  He denied any history of problematic alcohol use and his family agreed.  He denied history of tobacco or recreational drug use and his family agreed.    SOCIAL HISTORY:  Mr. Forrester was born and raised in [place] [facility] [place] [facility] [place]. He was unaware of any complications in his mother's pregnancy with him or in his birth, or delays in reaching developmental milestones.  He reported that he is the only one in his immediately family who is left-handed but both grandfathers were left-handed. He denied a history of early learning or attention difficulties, individualized instruction, or grade repetition. He described himself as an average student in high school, earning mostly Bs and Cs.  He did state that he tended to do better in more hands-on classes such as mechanical work.  He struggled more in history and English classes.  He " graduated on time with his class and estimated that he graduated towards the middle of his class.    Mr. 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  He initially worked for a number of companies but has most recently been at [employer] for 17 years working as a .  He is currently working the night shift and has been for the last 5 years.  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    Mr. Kraus was able to share that he has 1 son (Katharine) from a previous relationship (no grandchildren) and estimated that he has been  to his current wife Shira for 13 or 14 years (actually 11).    BEHAVIORAL OBSERVATIONS:   Mr. Forrester arrived 30 minutes early and accompanied by his wife Shira and son Katharine to today's appointment. He was appropriately dressed and groomed. He was alert and engaged during the interview. Gait was unremarkable. His mood was euthmyic and his affect was appropriately reactive. Rapport was easily established and eye contact was unremarkable. He was pleasant and cooperative. Prosody, and content of speech were grossly normal. However, significant word finding difficulties (but without paraphasic or grammatical errors) were evident throughout, leading to some hesitancy in his speech as he paused to take time to think or try and explain the word he was looking for. There was no evidence of a ricardo thought disorder; no hallucinations or delusions were apparent. Judgment and insight appeared limited.       Mr. Forrester appeared adequately motivated and engaged easily in the testing component of the evaluation.He attempted all tasks presented to him and worked at a slow but steady pace. He did not appear overly frustrated by  difficult or challenging tasks and responded appropriately to encouragement from the examiner. As during the interview, significant word finding difficulties were evident and he often took an extended period of time to indicate an answer. He denied being color blind but clearly struggled on tasks with a color component (Tokens, Color Naming). His family later shared that he has always had some difficulties discerning colors but not necessarily a classic red/green color blindness. Instructions often required repetition as he would indicate that he forgot what he was supposed to do. Otherwise, no other significant barriers to testing were observed and the following is judged to be a valid representation of Mr. Forrester's current cognitive strengths and weaknesses.    LIMITATIONS:  Due to circumstances that limit contact during in-person clinical visits, this assessment was conducted using face-to-face testing with the examiner wearing [employer] designated PPE and the patient wearing a face mask. The standard administration of these procedures involves in-person, face-to-face methods without PPE. The impact of applying non-standard administration methods has been evaluated only in part by scientific research. While every effort was made to simulate standard assessment practices, the diagnostic conclusions and recommendations for treatment provided in this report are being advanced with these limitations in mind.    TESTS ADMINISTERED:   BDAE (Complex Ideational Material-CIM, [place] Naming Test- BNT), Brief Visuospatial Memory Test - R Form 1 (BVMT-R), DKEFS (Color Word Interference- Word Reading only, Verbal Fluency)Generalized Anxiety Disorder-7 (TC-7), Patient Health Questionnaire (PHQ-9), Moss Verbal Learning Test - R Form 1 (HVLT-R), CUELLAR (Sentence Repetition Form 1, Tokens- Form B), [place] Picture Vocabulary Test, Fourth Edition (PPVT-4), [place] Complex Figure Test, copy only (RCFT), Trail Making  Test (TMT), WAIS-IV (Information, Block Design, Digit Span), WRAT-4 Blue (Word Reading, Spelling), and WMS-III Information and Orientation.    [facility] norms were used for BDAE-CIM, BNT, TMT  (Raw scores in parentheses)    DESCRIPTIVE PERFORMANCE KEY:    Labels for tests with Normal Distributions  Score Label Standard Score %ile Rank   Exceptionally high score  > 130 > 98   Above average score 120-129 91-97   High average score 110-119 75-90   Average score  25-74   Low average score 80-89 9-24   Below average score 70-79 2-8   Exceptionally low score < 70 < 2     Labels for tests with Non-Normal Distributions  Score Label %ile Rank   Within normal expectations/ limits score (WNL) > 24   Low average score 9-24   Below average score 2-8   Exceptionally low score < 2     The following test results utilize score labels as adapted from Dalton Carlisle, Adrian Naylor, Leona Wyman, ELLEN Wade, Mendy Ross, Josue Fischer, Nigel Lindquist & Conference Participants (2020): American Academy of Clinical Neuropsychology consensus conference statement on uniform labeling of performance test scores, The Clinical Neuropsychologist, DOI: 10.1080/[email or web address]    All scores contain some measure of error; scores are reported here as they are obtained by the individual (without reference to the range of error). These are meant as labels and not interpretation of performance. While other relevant comments regarding task performance are provided below, please see the Assessment, Impressions and Diagnostic Summary sections of this report for interpretation of the scores and the cognitive profile as a whole, including what does and does not constitute impairment.    OPTIMAL PREMORBID INTELLECT:  Optimal premorbid intellectual abilities were estimated as falling in the average range based on Mr. Forrester's educational and occupational histories.    SUMMARY OF TEST RESULTS:     Orientation,  "Attention and Processing Speed  Mental status exam was measured as a below average score for his age (10). He was oriented to personal information, city, year, month and day of the week and was able to correctly name the current president. He knew he was at a hospital but could not say which one. He stated it was the 15th (actually 14th). He could not name the most recent president and was almost an hour off when estimating the time.     Performance on a measure of basic attention and working memory was assessed as an exceptionally low score (12). This reflected a below average score for basic attention skills (LDF = 4) and working memory scores that ranged from an exceptionally low score (LDS = 2) to a low average score (LDB = 3).    A simple sequencing task (114\" ) was assessed as an exceptionally low score. A speeded word reading task (48\") was also assessed as an exceptionally low score.     Language  Sight word reading skills (49) were assessed as a below average score. Spelling skills (26) assessed as a below average score. Fund of general knowledge (4) was assessed as a below average score. His performance on a measure of semantic fluency was measured as an exceptionally low score (19). Confrontation naming was measured as an exceptionally low score (30). Comprehension of increasingly complex commands was measured as an exceptionally low score (26). Repetition of sentences was assessed as an exceptionally low score (4). On the Complex Ideational Material, he also earned an exceptionally low score (6). In contrast, receptive single word comprehension was measured as an average score (212).     Visuospatial Skills  Performance on a block construction task (4) resulted in an exceptionally low score. He had significant difficulty copying a series of 6 geometric figures (3). While he was able to generally recreate the overall gestalt of each figure, they were drawn somewhat carelessly and with poor attention to " detail.     Learning and Memory  He was administered a measure of rote auditory verbal list learning that required him to learn a series of 12 words over three trials and retain and recall them over a delay. His initial rate of learning (3,5,6) reflected an exceptionally low score. He retained and recalled no words (0% retention) after the delay for an exceptionally low score for delayed recall. Recognition memory was measured as a below average score as he correctly identified 9 of the original words and made 1 false positive error.     Immediate nonverbal memory (0,0,0) for a series of 6 geometric figures was measured as an exceptionally low score while delayed recall of these figures (no details) resulted in an exceptionally low score. Recognition memory was measured as an exceptionally low score as he correctly identified 5 of the original figures and made 3 false positive errors.    Executive Functioning  Working memory skills ranged from an exceptionally low score (LDS = 2) to a low average score (LDB = 3). A complex sequencing and set shifting task was discontinued at 6 minutes (at C) and notable for 4 errors by that point. His performance on a measure of phonemic fluency resulted in an exceptionally low score (13). Semantic set shifting skills were also measured as an exceptionally low score (Category Switching Accuracy = 2). His copy of a complex figure was performed as an exceptionally low score for his age (1.5; discontinued at 10 minutes) and notable for a disorganized, piecemeal approach.     Mood  On the Patient Health Questionnaire-9, a self report measure of depressive symptomatology, he obtained a score of 1, placing him in the range of minimal depression. He denied suicidal ideation.    On the Generalized Anxiety Disorder-7, a self-report measure of anxiety, he obtained a score of 0, placing him in the range of minimal anxiety.     EVALUATION SERVICES AND TIME:   A clinical  interview/neurobehavioral status examination was conducted with the patient and documented. I thoroughly reviewed the medical record, selected the neuropsychological test battery, provided supervision to the individual who administered and scored the neuropsychological test battery, interpreted/integrated patient data and test results, engaged in clinical decision making, treatment planning, report writing/preparation and provided and documented interactive feedback of test results on the day of testing. A trained examiner/technician directly administered and scored 2+ neuropsychological tests. Please see below for a breakdown of time spent and the associated codes billed for these services.     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 Forrester has a history of progressive language disturbance and was referred for an evaluation of Speech disturbance, unspecified type, Word finding difficulty. Feedback of results was provided on the day of testing.       Loyda Bustamante, PhD, LP, ABPP  Clinical Neuropsychologist, [id number]  Board Certified in Clinical Neuropsychology    [facility] Clinic[facility] [address], [address]  [place] [address]  Phone:  [phone]    The patient was seen for a neuropsychological evaluation for the purposes of diagnostic clarification and treatment planning. 190  minutes of face-to-face testing were provided by this writer. The patient was cooperative with testing. No concerns were brought to my attention. Please see Dr. Bustamante's report for a detailed description of the charges and interpretation and integration of the findings.      The patient was seen for a neuropsychological evaluation for the purposes of diagnostic clarification and treatment planning. 55 minutes were spent scoring and compiling test results by this writer. Please see Dr. Bustamante's report for a detailed description of the charges and interpretation and integration of the findings.    Again, thank you for allowing me to participate in the care of your patient.        Sincerely,        Loyda Bustamante, PhD LP

## 2023-04-14 NOTE — PROGRESS NOTES
The patient was seen for a neuropsychological evaluation for the purposes of diagnostic clarification and treatment planning. 55 minutes were spent scoring and compiling test results by this writer. Please see Dr. Bustamante's report for a detailed description of the charges and interpretation and integration of the findings.

## 2023-04-14 NOTE — PROGRESS NOTES
The patient was seen for a neuropsychological evaluation for the purposes of diagnostic clarification and treatment planning. 190 minutes of face-to-face testing were provided by this writer. The patient was cooperative with testing. No concerns were brought to my attention. Please see Dr. Bustamante's report for a detailed description of the charges and interpretation and integration of the findings.

## 2023-04-18 NOTE — PATIENT INSTRUCTIONS
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 Forrester retire and apply for disability. Given his severe cognitive deficits he will not be able to consistently perform his current job duties or any duties associated with traditional gainful employment.   Given his profile characterized by executive dysfunction, slowed processing speed and memory impairments, it is strongly recommended that Mr. Forrester stop driving. If he would like to continue driving, a formal driving evaluation is strongly recommended. Possible options for formal driving evaluations would be: [facility] ([phone]), [facility] program ([phone]) or any option recommended by his physician. However, given that his condition is progressive, it is unlikely he would be able to continue driving for very long.   A referral to Social Work/ Care Management would be appropriate to assist with transition from work and general support with changes moving forward. As his PCP is in the [facility] network, he will need to pursue services there. If Dr. Gurinder Jackson is aware of another avenue into Care Management in the [facility] system, I would happily defer to her to facilitate that process.   Mr. Forrester would benefit from help in his daily activities at home particularly in terms of managing finances. This would also be true for any medications that may be prescribed in the future.   It is strongly recommended that a family member or close friend accompany Mr. Forrester to all appointments to ensure that accurate information is  given to providers and instructions are followed. It will be helpful to present the information in brief, repeated segments and have him repeat back the information in his own words to ensure understanding. But ultimately, his family should be in charge of following through with any recommendations.   It is recommended that Mr. Forrester not venture into the community independently.  He is at high risk for being taken advantage of.  In addition, he is at risk for getting lost and turned around and may have significant problems finding his way home or even being able to express his home address to ask for help getting home.   If not already done, completion of paperwork for advance directives and assignment of healthcare and financial power of  should be considered at this time.  To the extent that his family can continue to provide support and assistance, it seems that the current living situation is working.  However, as more support is needed in the future or if his family is unable to provide that support, Mr. Forrester would benefit from increased supervision and support in his daily life so as to help his manage daily tasks such as cooking and cleaning as well as keeping track of medications and to ensure his personal safety. An assisted living facility would be ideal so as to allow his some independence while also providing a structured and supportive environment.    Family Resources  Mr. Forrester and his family may benefit from learning about Semantic Variant PPA (language variant FTD). The Association for Frontotemporal Degeneration (AFTD) has a lot of information ([phone]) or online at www.theaftd.org/understandingftd/disorders/semantic-dementia  Physical and Emotional Health  It is strongly recommended that as soon as he is able to stop working, that Mr. Forrester develop a regular sleep pattern including evaluating his current sleep hygiene behaviors and if need be, make changes to help facilitate sleep. Such  changes might include avoiding watching television or reading in bed before attempting to fall asleep (instead, he should perform these activities outside of the bedroom), avoid eating, consuming caffeine or exercising several hours before trying to fall asleep, avoid napping during the day, and establishing a regular bedtime and wake-up time.  Relaxation exercises (e.g., listening to soothing music, deep breathing, progressive muscle relaxation) right before going to bed might also help. If he tends to have difficulty returning to sleep in the night or in falling asleep due to worrying, cognitive strategies, which could be discussed with a therapist may also be useful. If these techniques do not improve his sleep, he may wish to consult his physician to assess for any underlying physical issues that may be impacting his sleep and to determine if a formal sleep study is warranted.  Mr. Forrester does not appear to be struggling with any significant emotional symptoms. Behavioral activation techniques such as regular exercise (under the guidance of his physician), recreational activities and regular social interaction would likely be effective in helping Mr. Forrester to continue to maintain his mood.   Memory and Organization  Mr. Kraus may benefit from a trial of speech therapy (I.e., cognitive rehabilitation) to determine if there are techniques or skills he can use to maximize his language abilities and also support his memory. We did discuss this during the feedback session and I shared that I would be happy to enter a referral through [facility], or they could discuss this in their upcoming appointment with Dr. Gurinder Jackson. They could also pursue speech therapy again through [facility].   Mr. Forrester is encouraged to continue to engage in stimulating activities, (i.e., reading, card games, puzzles) to keep him cognitively active.   In his daily life, Mr. Forrester may find it helpful to post reminder notes around the house,  make lists, and carry a small calendar so that he can feel more comfortable and confident in his ability to remember information. A daily planner could also be used as a memory book where important information is recorded and organized for future reference.   If needed, Mr. Forrester could also create a system to establish set locations for certain items (i.e., keys) such that he always knows where to put them upon entering the house and where to look when he needs them. If he would like to keep certain items out of sight (i.e., a wallet), he could set up a specific hidden place to keep items and use that same place so as to ensure he can find the required item when needed.   Mr. Forrester exhibits memory impairments but does show some benefit from cues, thus he will do best when provided with reminders to facilitate retrieval of important information.   When required to learn new information, Mr. Forrester performed best when information was repeated. When medical procedures or instructions are being explained to him, it might be helpful to present the information in brief, repeated segments and have him repeat back the information in his own words to ensure understanding.   Mr. Forrester will do best in an environment where a lot of routines are established so that he can follow a regular pattern for his daily or weekly routines.   Mr. Forrester demonstrates intact basic attention but significant memory impairments, thus, he should not be given instructions for tasks any more than a few minutes in the future.  Mr. Forrester should be aware that he may not be able to process information as quickly and efficiently as he once could. Thus he should allow himself extra time to complete tasks and not try and work under extreme time constraints.   Follow-up  Given the severity of Mr. Forrester's cognitive deficits, no future testing is recommended at this time unless questions of diagnosis or dispositional planning arise   While I think it highly unlikely  that sleep alone could account for his presentation, if significant cognitive improvement is evident following a return to normal sleep patterns, re-evaluation is recommended (no need to wait for 1 year, just as soon as significant improvements have been present for at least a month).

## 2023-04-24 ENCOUNTER — TELEPHONE (OUTPATIENT)
Dept: NEUROLOGY | Facility: CLINIC | Age: 58
End: 2023-04-24

## 2023-04-24 ENCOUNTER — HOSPITAL ENCOUNTER (OUTPATIENT)
Dept: SPEECH THERAPY | Facility: REHABILITATION | Age: 58
Discharge: HOME | End: 2023-04-24
Attending: FAMILY MEDICINE
Payer: COMMERCIAL

## 2023-04-24 DIAGNOSIS — F02.A0 MAJOR NEUROCOGNITIVE DISORDER, DUE TO FRONTOTEMPORAL LOBAR DEGENERATION, WITHOUT BEHAVIORAL DISTURBANCE, MILD (H): Primary | ICD-10-CM

## 2023-04-24 DIAGNOSIS — G31.09 MAJOR NEUROCOGNITIVE DISORDER, DUE TO FRONTOTEMPORAL LOBAR DEGENERATION, WITHOUT BEHAVIORAL DISTURBANCE, MILD (H): Primary | ICD-10-CM

## 2023-04-24 PROCEDURE — 92523 SPEECH SOUND LANG COMPREHEN: CPT | Mod: GN

## 2023-04-24 NOTE — TELEPHONE ENCOUNTER
[facility] Call Center    Phone Message    May a detailed message be left on voicemail: yes     Reason for Call: Other: Pt spouse is calling because Pt was suppose to have a visit today 4/24 but provider was out of the office. Pt spouse would like to set up a return video visit to follow up on testing, writer is unable to view providers template. Please call spouse to schedule    Action Taken: Message routed to:  Other: [facility]    Travel Screening: Not Applicable

## 2023-04-25 NOTE — PROGRESS NOTES
04/24/23 1500       Present No   Comments Pt speaks English   General Information   Type of Evaluation Cognitive-Linguistic   Type Of Visit Initial   Start Of Care Date 04/24/23   Referring Physician Vic Hanna MD   Orders Evaluate And Treat   Medical Diagnosis Neurocognitive disorder ; Other speech disturbances   Onset Of Illness/injury Or Date Of Surgery 04/14/23   Precautions/Limitations  no known precautions/limitations   Hearing WFL for conversational speech   Surgical/Medical history reviewed Yes   Pertinent History Of Current Problem Robbin is a 57-year-old male who was referred for a speech evaluation due to memory and speech changes. Pt accompanied by his son to the evaluation; Robbin was unable to tell the SLP his son s name. Robbin lives at home with his wife. Pt reports he sometimes has a hard time finding his words and that he has  good days and bad days . No swallowing concerns noted from patient or son. When asked what brings him in for an evaluation, pt reports,  they believe I have a little impediment . Pt s son noted symptoms begame a number of years ago and have gradually progressed.      Mr. Forrester was initially seen for Neurology consult by Dr. Gurinder Jackson on 10/28/21 and then a Neuropsychology consult at [facility] on 4/14/2023. Prior to recent testing, little medical history reported. Pt received these recent evaluations due to speech difficulties. Per chart review,  On exam, he has difficulty with expressive language and word-finding difficulties.  He also seems a little bit slow to answer my questions, though this could just be due to his speech difficulties. He scored 17/30 on the [place] today, with difficulties in multiple tasks including visuospatial, recall, attention and significantly: language . Concerns noted about an underlying language-predominant degenerative process.     Patient currently on FMLA from work.  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 He worked 3rd shift (10pm-6am) for the past 5 years but has been within the company for 17 years. Pt also worked as a high-level union rep. Pt demonstrated significant ability explaining this to the SLP and son helped fill in the gaps. Significant changes in memory and speech noted from pt s son and from chart review.     Prior Level Of Function Comment independent   Patient Role/employment History   (Pt on leave from work as a CNC  at [employer].)   Living environment [place]   General Observations Pt exhibits significant difficulty conveying his communication message. SLP used context cues and support from his son to gather information Robbin was trying to convey.    Patient/family Goals Pt states goal as follows,  to not have to     Fall Risk Screen   Fall screen completed by SLP   Have you fallen 2 or more times in the past year? No   Have you fallen and had an injury in the past year? No   Is patient a fall risk? No   Abuse Screen (yes response referral indicated)   Feels Unsafe at Home or Work/School no   Feels Threatened by Someone no   Does Anyone Try to Keep You From Having Contact with Others or Doing Things Outside Your Home? no   Physical Signs of Abuse Present no   Oral Motor Sensory Function   Comments A formal oral motor evaluation was not completed based on time restrictions. Based on observation, Robbin demonstrated symmetrical oral structures with no obvious weakness or incoordination.  He was able to achieve lip closure, open his mouth appropriately, and did not demonstrate drooling. Ongoing observation of more refined oral motor skills will take place during speech-language therapy sessions.   Speech   Deficits in Speech Respiration None   Deficits in Phonation None   Speech Comments No apraxias or dysarthrias noted. Patient and son report his voice has not changed.   Language: Auditory Comprehension (understanding of spoken language)    Comments (Auditory Comprehension) Not formally assessed this date due to time constraint. Recommend further assessment of auditory comprehension when treatment is initiated. Per chart review, informal assessment, and patient interview, pt benefits from shortened instructions, repetition, and visual reminders. Pt's son reports Robbin had a hard time following verbal prompts while setting up [employer].      RBANS;please see separate report for results    Language: Verbal Expression (use of spoken language to express information)   Comments (Verbal Expression) Verbal expression was evaluated through informal assessment, patient interview, and RBANS administration. Pt experiences occasional word finding difficulty, especially in the conversation level. In the picture naming subtest of the RBANS, pt was in the </= 2 percentile group. Based on results, clinical observations, Robbin Forrester presents with severe expressive language deficits.    Reading Comprehension (understanding of written language)   Comments (Reading Comprehension) Not formally assessed this date due to time constraint. Recommend further assessment of reading comprehension when treatment is initiated. Robbin Forrester reported he does not do a lot of reading.     RBANS;please see separate report for results   Written Expression (use of writing to express information)   Comments (Written Expression) Not formally assessed this date due to time constraint. Recommend further assessment of written expression when treatment is initiated. Per chart review, informal assessment, and patient interview, no concerns identified or reported. Pt reports he only uses voice to text. He does not do a lot of writing.    Pragmatics (the social or functional use of a language)   Deficits noted in Nonverbal None   Deficits noted in Conversational Skills None   Deficits noted in the Use of Linguistic Context None   Cognitive Status Examination   Attention impaired   Behavioral  Observations alert   Short Term Memory impaired   Standardized cognitive-linguistic assessment completed   (Partial RBANS assessment completed. Unable to complete due to time limitations. Plan to finish RBANS assessment when treatment is initiated.)   Cognitive Status Exam Comments Portions of the RBANS was administered to Robbin Forrester on May 2, 2023. Compared to similar aged-adults, Robbin Forrester tested below average in the areas of list learning, story memory, figure copy, picture naming, and semantic fluency subtests of the RBANS assessment. Robbin Forrester presents with severe cognitive-linguistic deficits. Deficits were observed in the areas of immediate memory, visuospatial/constructional, and language. His immediate memory was moderately impaired marked by difficulty repeating a story. A course of skilled speech therapy targeting the cognitive-linguistic goals is recommended to improve pt's ability to participate in daily conversations with family and friends, and to improve overall communication and quality of life.    Education Assessment   Barriers to Learning Visual   Preferred Learning Style Listening;Demonstration;Pictures/video   General Therapy Interventions   Planned Therapy Interventions Cognitive Treatment;Language   Cognitive treatment Internal memory strategy training;External memory strategy training   Language Auditory comprehension;Verbal expression   Clinical Impression, SLP Eval   Criteria for Skilled Therapeutic Interventions Met (SLP Eval) Yes, treatment indicated   SLP Diagnosis severe cognitive-linguistic deficits   Rehab potential affected by [place] of home program recommendations   Therapy Frequency 1 time;per week   Predicted Duration of Therapy Intervention (days/wks) 3 months, pending progress   Risks and Benefits of Treatment have been explained. Yes   Patient, Family & other staff in agreement with plan of care Yes   Clinical Impression Comments Based on the patient/family interview,  chart review, RBANS administration, and clinical observations, Robbin Forrester presents with severe cognitive-linguistic deficits characterized by difficulties with immediate memory, visuospatial/construction, and language. His reduced cognitive-linguistic abilities negatively impact functional communication at home and in the community. It is recommended that Robbin Forrester participate in SLP therapy at a frequency of 1x/week for 3 months, pending progress. Skilled speech-language therapy services are medically necessary in order to address langue skills and improve overall communication abilities. Order placed for OT therapy.    Cognitive/Communication Goals   Cognitive/Communication Goals 1;2;3;4   Cognitive/Communication Goal 1   Goal Identifier 1. Memory   Goal Description Robbin will participate in moderate level functional memory and attention tasks with >80% accuracy with strategy use by end of POC.   Target Date 07/22/23   Cognitive/Communication Goal 2   Goal Identifier 2. Word finding   Goal Description Robbin will name 10+ items in 2-3 categories for generative naming task with use of strategies.   Target Date 07/22/23   Cognitive/Communication Goal 3   Goal Identifier 3. Cognitive-Linguistic Strategies   Goal Description Robbin will learn and demonstrate use of x3 cognitive-linguistic strategies by end of POC.   Target Date 07/22/23   Cognitive/Communication Goal 4   Goal Identifier 4. Testing   Goal Description Robbin will complete cognition/word finding assessment to determine necessary goals by end of the POC.   Target Date 07/22/23   Total Session Time   Sound production with lang comprehension and expression minutes ([id number]) 45   Total Evaluation Time 45   Therapy Certification   Certification date from 04/24/23   Certification date to 07/22/23   Medical Diagnosis Neurocognitive disorder ; Other speech disturbances   Certification I certify the need for these services furnished under this plan of treatment and  while under my care.  (Physician co-signature of this document indicates review and certification of the therapy plan).     The risks and benefits have been explained. These results, goals, and recommendations were discussed and agreed upon. It was a pleasure to work with Robbin. Thank you for your referral. If you have any questions or concerns, please feel free to contact me at your convenience.    Patricia Owen MS, CCC-SLP  Speech Language Pathologist  [facility]  [email or web address]  [email or web address]

## 2023-04-28 ENCOUNTER — OFFICE VISIT (OUTPATIENT)
Dept: NEUROLOGY | Facility: CLINIC | Age: 58
End: 2023-04-28
Payer: COMMERCIAL

## 2023-04-28 VITALS
SYSTOLIC BLOOD PRESSURE: 124 MMHG | HEART RATE: 68 BPM | BODY MASS INDEX: 32.14 KG/M2 | DIASTOLIC BLOOD PRESSURE: 82 MMHG | WEIGHT: 264 LBS

## 2023-04-28 DIAGNOSIS — G31.09 FRONTOTEMPORAL DEMENTIA (H): Primary | ICD-10-CM

## 2023-04-28 DIAGNOSIS — F02.80 PRIMARY PROGRESSIVE APHASIA (H): ICD-10-CM

## 2023-04-28 DIAGNOSIS — F02.80 FRONTOTEMPORAL DEMENTIA (H): Primary | ICD-10-CM

## 2023-04-28 DIAGNOSIS — G31.01 PRIMARY PROGRESSIVE APHASIA (H): ICD-10-CM

## 2023-04-28 PROCEDURE — 99215 OFFICE O/P EST HI 40 MIN: CPT | Performed by: PSYCHIATRY & NEUROLOGY

## 2023-04-28 RX ORDER — DONEPEZIL HYDROCHLORIDE 5 MG/1
5 TABLET, FILM COATED ORAL AT BEDTIME
Qty: 30 TABLET | Refills: 0 | Status: SHIPPED | OUTPATIENT
Start: 2023-04-28 | End: 2024-05-14

## 2023-04-28 RX ORDER — DONEPEZIL HYDROCHLORIDE 10 MG/1
10 TABLET, FILM COATED ORAL AT BEDTIME
Qty: 30 TABLET | Refills: 5 | Status: SHIPPED | OUTPATIENT
Start: 2023-05-28 | End: 2024-03-07

## 2023-04-28 NOTE — PATIENT INSTRUCTIONS
Plan:  -- Start Aricept (donepezil): 5mg at bedtime for 1 month and then increase to 10mg at bedtime.  -- I will reach out to Dr. Hanna about getting you set up with a .  -- I agree with your plan to get started with speech therapy and Occupational Therapy.  -- Keep up the good work with staying physically active!  -- Return to neurology clinic in 3-4 months.

## 2023-04-28 NOTE — NURSING NOTE
IMTIAZ COGNITIVE ASSESSMENT ([facility])  Version 7.1 Original Version  VISUOSPATIAL/EXECUTIVE               COPY CUBE      [   0 ]                                [  0  ] DRAW CLOCK (Ten past eleven)  (3 points)    [  1  ]                    [  0  ]               [ 0   ]       Contour            Numbers     Hands POINTS                 1  / 5   NAMING    [ 1  ]                                                                        [ 1   ]                                             [   1 ]  Lireid Valadez                                Camel                3     / 3   MEMORY Read list of words, subject must repeat them. Do 2 trials, even if 1st trial is successful. Do a recall after 5 minutes  FACE VELVET [employer] FRITZ RED No Points    1st          2nd         ATTENTION Read list of digits (1 digit/sec) Subject has to repeat in the forward order       [  0  ]   2  1  8  5  4                                [  1  ] 7 4 2                        1  /2   Read list of letters. The subject must tap with his hand at each letter A. No points if > 2 errors.  [  1  ] F B A C M N A A J K L B A F A K D E A A A J A M O F A A B            1  /1   Serial 7 subtraction starting at 100          [  1  ] 93         [    ] 86          [    ] 79          [    ] 72         [    ] 65   4 or 5 correct subtractions: 3 points,  2 or 3 correct: 2 points,  1correct: 1 point,   0 correct: 0 points          1  /3   LANGUAGE Repeat: I only know that Abad is the one to help today. [  0   ]                                      The cat always hid under the couch when dogs were in the room. [  0 ]           0    /2   Fluency: Name maximum number of words in one minute that begin with the letter F                                                                                                                    [  0  ] ___ (N > 11 words)           0    /1   ABSTRACTION Similarity between  e.g. banana-orange=fruit                                                                   [ 1   ] train-bicycle                      [0    ] watch-ruler         1     /2   DELAYED  RECALL Has to recall words  WITH NO CUE FACE  [    ] VELVET  [    ] Religious  [    ]  FRITZ  [    ] RED  [    ] Points for UNCUED recall only      0      /5           OPTIONAL Category cue           Multiple choice cue          ORIENTATION  [  1  ] Date     [   1 ] Month       [  1  ] Year      [ 1   ] Day      [  0  ] Place        [  1  ] City       5   /6   TOTAL  Normal > 26/30 Add 1 point if < 12 years education     13 /30

## 2023-04-28 NOTE — PROGRESS NOTES
ESTABLISHED PATIENT NEUROLOGY NOTE    DATE OF VISIT: 4/28/2023  MRN: [id number]  PATIENT NAME: Robbin Forrester  YOB: 1965    Chief Complaint   Patient presents with     Follow Up     Neuropsych follow-up        SUBJECTIVE:                                                      HISTORY OF PRESENT ILLNESS:  Robbin is here for follow up regarding Neuropsych test results.  I met the patient for consultation regarding memory and speech issues about a year and a half ago.    History as previously documented by me (10.28.21):  There are very few records available on the patient.  The medical records department at [facility] he was last seen there in January 2020.  The provider listed as the referring provider, saw the patient back in 2013 in the [facility], no more-recent visits.     Robbin is accompanied by his wife today.  They confirm that he does still follow-up with Dr. Hanna, at an [facility] clinic on [place].  They ask the reason for his visit, to be starts to tell me about his work schedule being somewhat unusual.  At first I do not really understand what he is trying to tell me.  Through clarification I found out that he works third shift.  Because of this his sleep cycle has been disrupted in recent years.  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  Robbin tells me that at work, he has 6 different duties at work. When he gets to the end of the day, he tends to fall asleep with his dogs. When he is woken from sleep, he has trouble with slowness and difficulty with annunciation. His wife confirms that his speech is much worse when he first wakes. She has noticed word-finding difficulties in general for about 2 years, which correlates with the time he started working overnights. No problems with memory from their standpoint.  He has found it helpful to try and keep his dogs on a regular schedule, so that he is woken less frequently.  On his weekends, or if his wife is home, he is able to get much more restful  sleep.     His week begins on Sunday. He says he can be up 24-26 hours in a row.  He has not really noticed problems with remembering people's names.  No mention of problems with writing.     He denies problems with driving, though his wife has noticed that he crosses over the [place] line sometimes, and roundabouts are very difficult for him to maneuver.  He does have an eye condition that affects his ability to see well during the day, so she wonders if it is related to this.  He feels that he sees better driving at night.    He has not had problems with getting lost driving.       Robbin denies weakness or sensory changes.  He is not typically on headaches.  No problems with swallow.  No problems controlling his bladder or bowels.  Wife has not noticed a change in his gait.  He has not had any work-up for the language issues as of yet.  No recent brain imaging.     He has 3 dogs.     No family history of dementia.    MoCA score was 17/30 at that visit.  Brain MRI was unremarkable after that visit.  I recommended neuropsych evaluation as well, and for some reason this was just completed earlier this month.    Per Dr. Bustamante's 4.14.23 Assessment:  Summary for Providers  ASSESSMENT:    Impairments (mostly moderate to severe) identified across all domains assessed including attention, speed of thinking, language, learning, memory, and executive functioning    Ironically, his strongest and only intact score was on a language task, a measure of single word comprehension     Of note, memory deficits are retrieval based (I.e., he demonstrates some, albeit limited, benefit from cues)    Test scores together with his behavioral presentation, and history of symptoms reported by family (including progressive language decline with more recent memory difficulties) suggest that his presentation very likely represents a Primary Progressive Aphasia (likely semantic variant given impairments in confrontation naming, regularization  errors in reading and writing and notable word finding difficulties in conversational speech but intact grammar and fluency)    It is notable, however, that his language deficits are pervasive and impacting even over-learned skills (sight word reading, speeding reading of simple words) as well as skills that would normally be persevered in Semantic variant PPA (repetition, comprehension). Unfortunately I suspect that this relates to progression of the condition that has been evident for upwards of 3-4 years now (initallly presented to Neurology in the fall of 2021 with difficulties evident 1-2 years prior).     The global nature of cognitive deficits outside of language (I.e., memory, attention, cognitive efficiency and executive functioning) are also likely explained by progression of PPA, which although initially impacts language primarily, progresses over time to impact all cognitive skills    Of note, concern was raised about third shift work and whether insufficient sleep and disrupted sleep patterns could account for his presentation.  While it is very likely that sleep disruption is exacerbating or exaggerating his difficulties, it is highly unlikely that poor sleep alone can account for this presentation.    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 While this may have been the case to some extent, he was able to see adequately on a number of tasks (including a picture vocabulary task where he demonstrated average performance) and performed poorly on many tasks without a visual component. Reduced vision alone cannot account for his deficits on testing.    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 I'm very surprised that he has been able to continue work this long without mistakes and I suspect there may be errors that are not  being detected    A referral to Social Work/ Care Management would be appropriate to assist with transition from work and general support with changes moving forward. As his PCP is in the [facility] network, he will need to pursue services there. If Dr. Gurinder Jackson is aware of another avenue into Care Management in the [facility] system, I would happily defer to her to facilitate that process.     Strongly recommend that he discontinue driving (given impairments in memory, speeded processing and executive functioning). He could consider a driving evaluation but as his cognition will continue to decline over time, it is unlikely he would be able to continue driving for very long.     It is recommended that his family continue to manage daily activities (I.e., cooking, finances, driving)    A trial of speech therapy may be beneficial to maximize language functioning.     Given the severity of his cognitive deficits, no future testing is recommended at this time unless questions of diagnosis or dispositional planning arise     While I think it highly unlikely that sleep alone could account for his presentation, if significant cognitive improvement is evident following a return to normal sleep patterns, re-evaluation is recommended (no need to wait for 1 year, just as soon as significant improvements have been present for at least a month) to determine if PPA continues to represent the most appropriate diagnosis    Robbin is here with his wife today.  They are in the process of working on getting his disability forms filled out.  His last day of work was 12 April.  He has stopped driving as recommended.  They did already contact his primary physician about referrals for speech therapy and occupational therapy.  His wife has been trying to keep him busy from a physical standpoint, son also helps.  Robbin tells me he feels better after working out.  He says sleep has been really good lately now that his schedule affords this.   He can get up to 8 hours of sleep which is much better than the 4 hours he was getting previously.  Wife asks about how to get a referral to social work through the [facility] system per there preference.  He would like to keep Dr. Hanna as his primary physician.    CURRENT MEDICATIONS:   MULTIPLE VITAMIN PO, Take by mouth daily    No current facility-administered medications on file prior to visit.      RECENT DIAGNOSTIC STUDIES:   Labs: No results found for any visits on 04/28/23.    Imaging:   MRI Brain (12.7.21):  FINDINGS:  INTRACRANIAL CONTENTS: The exam is somewhat limited by motion-related artifacts. No definite acute or subacute infarct. No mass, acute hemorrhage, or extra-axial fluid collections. Scattered nonspecific T2/FLAIR hyperintensities within the cerebral white   matter most consistent with mild chronic microvascular ischemic change. Mild to moderate generalized cerebral atrophy. No hydrocephalus. Normal position of the cerebellar tonsils. There is a small probable developmental venous anomaly in the left   posterior parietal region (series 10, images 16-18). There are also may be a mildly prominent transmantle venous structure along the anterior frontal lobe (series 10, image 13; series 11, image 6). Otherwise, no abnormal intracranial postcontrast   enhancement seen.     SELLA: No abnormality accounting for technique.     OSSEOUS STRUCTURES/SOFT TISSUES: Normal marrow signal. The major intracranial vascular flow voids are maintained.      ORBITS: Bilateral lens implants. Slightly lobulated contour of both ocular globes, nonspecific and benign in appearance. No acute intraorbital abnormality identified.     SINUSES/MASTOIDS: Trace/mild scattered paranasal sinus mucosal thickening. No middle ear or mastoid effusion.                                                                    IMPRESSION:  1.  No acute intracranial process.  2.  Generalized brain atrophy and presumed microvascular  ischemic changes as detailed above.    Imaging reviewed independently by me.  Agree with radiology read.    REVIEW OF SYSTEMS:                                                      10-point review of systems is negative except as mentioned above in HPI.    EXAM:                                                      Physical Exam:   Vitals: /82   Pulse 68   Wt 119.7 kg (264 lb)   BMI 32.14 kg/m    BMI= Body mass index is 32.14 kg/m .  GENERAL: NAD.  HEENT: NC/AT.  CV: RRR. S1, S2.   NECK: No bruits.  PULM: Non-labored breathing.   Neurologic:  MENTAL STATUS: Alert, attentive. Speech is slow, with hesitation and clear word finding difficulties.  He does not seem to have any problems with comprehension. MoCA: 13/30 (previous score was 17.  Normal is 26 and above). Normal concentration. Fair fund of knowledge.   CRANIAL NERVES: Visual fields intact to confrontation. Pupils equally, round and reactive to light. Facial sensation and movement normal. EOM full. Hearing intact to conversation. Trapezius strength intact. Palate moves symmetrically. Tongue midline.  MOTOR: 5/5 in proximal and distal muscle groups of upper and lower extremities with brief testing  STATION AND GAIT: Casual gait is normal.  Left hand-dominant.    ASSESSMENT and PLAN:                                                      Assessment:    ICD-10-CM    1. Frontotemporal dementia (H)  G31.09 donepezil (ARICEPT) 5 MG tablet    F02.80 donepezil (ARICEPT) 10 MG tablet      2. Primary progressive aphasia (H)  G31.01     F02.80            Mr. Forrester is a pleasant 11-year-old man here for follow-up regarding neuropsych testing.  We reviewed the above findings and talked about the diagnosis and prognosis.  I agree with his plan to move forward with some therapies.  We will work on his short-term disability and then getting him a more permanent disability from a paperwork standpoint.  He has stopped driving and is okay with this.  Sleep has improved and it  sounds like his mood is okay despite the cognitive changes.  Family is supportive.  We did discuss also doing a trial of Aricept.  I am not sure if this will lead to any improvement but and it is worth adding as a supportive measure.  We will have Robbin return to clinic in 3-4 months for follow-up.  He and his wife expressed understanding and agreement with the plan.    Plan:  -- Start Aricept (donepezil): 5mg at bedtime for 1 month and then increase to 10mg at bedtime.  -- I will reach out to Dr. Hanna about getting you set up with a .  -- I agree with your plan to get started with speech therapy and Occupational Therapy.  -- Keep up the good work with staying physically active!  -- Return to neurology clinic in 3-4 months.    Total Time: 60 minutes were spent with the patient and in chart review/documentation (as described above in Assessment and Plan)/coordinating the care on date of service.    Irma Jackson MD  Neurology    Dragon software used in the dictation of this note.

## 2023-04-28 NOTE — NURSING NOTE
"Robbin Forrester is a 57 year old male who presents for:  Chief Complaint   Patient presents with     Follow Up     Neuropsych follow-up           Initial Vitals:  /82   Pulse 68   Wt 119.7 kg (264 lb)   BMI 32.14 kg/m   Estimated body mass index is 32.14 kg/m  as calculated from the following:    Height as of 10/28/21: 1.93 m (6' 4\").    Weight as of this encounter: 119.7 kg (264 lb).. Body surface area is 2.53 meters squared. BP completed using cuff size: wrist cuff    Amaury Carmen  "

## 2023-04-28 NOTE — LETTER
4/28/2023         RE: Robbin Forrester  [address] [address]        Dear Colleague,    Thank you for referring your patient, Robbin Forrester, to the [facility] NEUROLOGY CLINIC [place]. Please see a copy of my visit note below.    ESTABLISHED PATIENT NEUROLOGY NOTE    DATE OF VISIT: 4/28/2023  MRN: [id number]  PATIENT NAME: Robbin Forrester  YOB: 1965    Chief Complaint   Patient presents with     Follow Up     Neuropsych follow-up        SUBJECTIVE:                                                      HISTORY OF PRESENT ILLNESS:  Robbin is here for follow up regarding Neuropsych test results.  I met the patient for consultation regarding memory and speech issues about a year and a half ago.    History as previously documented by me (10.28.21):  There are very few records available on the patient.  The medical records department at [facility] he was last seen there in January 2020.  The provider listed as the referring provider, saw the patient back in 2013 in the [facility] ER, no more-recent visits.     Robbin is accompanied by his wife today.  They confirm that he does still follow-up with Dr. Hanna, at an [facility] clinic on [place].  They ask the reason for his visit, to be starts to tell me about his work schedule being somewhat unusual.  At first I do not really understand what he is trying to tell me.  Through clarification I found out that he works third shift.  Because of this his sleep cycle has been disrupted in recent years.  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  Robbin tells me that at work, he has 6 different duties at work. When he gets to the end of the day, he tends to fall asleep with his dogs. When he is woken from sleep, he has trouble with slowness and difficulty with annunciation. His wife confirms that his speech is much worse when he first wakes. She has noticed word-finding difficulties in general for about 2 years, which correlates with the time he started  working overnights. No problems with memory from their standpoint.  He has found it helpful to try and keep his dogs on a regular schedule, so that he is woken less frequently.  On his weekends, or if his wife is home, he is able to get much more restful sleep.     His week begins on Sunday. He says he can be up 24-26 hours in a row.  He has not really noticed problems with remembering people's names.  No mention of problems with writing.     He denies problems with driving, though his wife has noticed that he crosses over the [place] line sometimes, and roundabouts are very difficult for him to maneuver.  He does have an eye condition that affects his ability to see well during the day, so she wonders if it is related to this.  He feels that he sees better driving at night.    He has not had problems with getting lost driving.       Robbin denies weakness or sensory changes.  He is not typically on headaches.  No problems with swallow.  No problems controlling his bladder or bowels.  Wife has not noticed a change in his gait.  He has not had any work-up for the language issues as of yet.  No recent brain imaging.     He has 3 dogs.     No family history of dementia.    MoCA score was 17/30 at that visit.  Brain MRI was unremarkable after that visit.  I recommended neuropsych evaluation as well, and for some reason this was just completed earlier this month.    Per Dr. Bustamante's 4.14.23 Assessment:  Summary for Providers  ASSESSMENT:    Impairments (mostly moderate to severe) identified across all domains assessed including attention, speed of thinking, language, learning, memory, and executive functioning    Ironically, his strongest and only intact score was on a language task, a measure of single word comprehension     Of note, memory deficits are retrieval based (I.e., he demonstrates some, albeit limited, benefit from cues)    Test scores together with his behavioral presentation, and history of symptoms reported by  family (including progressive language decline with more recent memory difficulties) suggest that his presentation very likely represents a Primary Progressive Aphasia (likely semantic variant given impairments in confrontation naming, regularization errors in reading and writing and notable word finding difficulties in conversational speech but intact grammar and fluency)    It is notable, however, that his language deficits are pervasive and impacting even over-learned skills (sight word reading, speeding reading of simple words) as well as skills that would normally be persevered in Semantic variant PPA (repetition, comprehension). Unfortunately I suspect that this relates to progression of the condition that has been evident for upwards of 3-4 years now (tanner presented to Neurology in the fall of 2021 with difficulties evident 1-2 years prior).     The global nature of cognitive deficits outside of language (I.e., memory, attention, cognitive efficiency and executive functioning) are also likely explained by progression of PPA, which although initially impacts language primarily, progresses over time to impact all cognitive skills    Of note, concern was raised about third shift work and whether insufficient sleep and disrupted sleep patterns could account for his presentation.  While it is very likely that sleep disruption is exacerbating or exaggerating his difficulties, it is highly unlikely that poor sleep alone can account for this presentation.    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 While this may have been the case to some extent, he was able to see adequately on a number of tasks (including a picture vocabulary task where he demonstrated average performance) and performed poorly on many tasks without a visual component. Reduced vision alone cannot account for his deficits on testing.    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 I'm very surprised that he has been able to continue work this long without mistakes and I suspect there may be errors that are not being detected    A referral to Social Work/ Care Management would be appropriate to assist with transition from work and general support with changes moving forward. As his PCP is in the [facility] network, he will need to pursue services there. If Dr. Gurinder Jackson is aware of another avenue into Care Management in the [facility] system, I would happily defer to her to facilitate that process.     Strongly recommend that he discontinue driving (given impairments in memory, speeded processing and executive functioning). He could consider a driving evaluation but as his cognition will continue to decline over time, it is unlikely he would be able to continue driving for very long.     It is recommended that his family continue to manage daily activities (I.e., cooking, finances, driving)    A trial of speech therapy may be beneficial to maximize language functioning.     Given the severity of his cognitive deficits, no future testing is recommended at this time unless questions of diagnosis or dispositional planning arise     While I think it highly unlikely that sleep alone could account for his presentation, if significant cognitive improvement is evident following a return to normal sleep patterns, re-evaluation is recommended (no need to wait for 1 year, just as soon as significant improvements have been present for at least a month) to determine if PPA continues to represent the most appropriate diagnosis    Robbin is here with his wife today.  They are in the process of working on getting his disability forms filled out.  His last day of work was 12 April.  He has stopped driving as recommended.  They did already contact his primary physician about referrals for speech  therapy and occupational therapy.  His wife has been trying to keep him busy from a physical standpoint, son also helps.  Robbin tells me he feels better after working out.  He says sleep has been really good lately now that his schedule affords this.  He can get up to 8 hours of sleep which is much better than the 4 hours he was getting previously.  Wife asks about how to get a referral to social work through the [facility] system per there preference.  He would like to keep Dr. Hanna as his primary physician.    CURRENT MEDICATIONS:   MULTIPLE VITAMIN PO, Take by mouth daily    No current facility-administered medications on file prior to visit.      RECENT DIAGNOSTIC STUDIES:   Labs: No results found for any visits on 04/28/23.    Imaging:   MRI Brain (12.7.21):  FINDINGS:  INTRACRANIAL CONTENTS: The exam is somewhat limited by motion-related artifacts. No definite acute or subacute infarct. No mass, acute hemorrhage, or extra-axial fluid collections. Scattered nonspecific T2/FLAIR hyperintensities within the cerebral white   matter most consistent with mild chronic microvascular ischemic change. Mild to moderate generalized cerebral atrophy. No hydrocephalus. Normal position of the cerebellar tonsils. There is a small probable developmental venous anomaly in the left   posterior parietal region (series 10, images 16-18). There are also may be a mildly prominent transmantle venous structure along the anterior frontal lobe (series 10, image 13; series 11, image 6). Otherwise, no abnormal intracranial postcontrast   enhancement seen.     SELLA: No abnormality accounting for technique.     OSSEOUS STRUCTURES/SOFT TISSUES: Normal marrow signal. The major intracranial vascular flow voids are maintained.      ORBITS: Bilateral lens implants. Slightly lobulated contour of both ocular globes, nonspecific and benign in appearance. No acute intraorbital abnormality identified.     SINUSES/MASTOIDS: Trace/mild  scattered paranasal sinus mucosal thickening. No middle ear or mastoid effusion.                                                                    IMPRESSION:  1.  No acute intracranial process.  2.  Generalized brain atrophy and presumed microvascular ischemic changes as detailed above.    Imaging reviewed independently by me.  Agree with radiology read.    REVIEW OF SYSTEMS:                                                      10-point review of systems is negative except as mentioned above in HPI.    EXAM:                                                      Physical Exam:   Vitals: /82   Pulse 68   Wt 119.7 kg (264 lb)   BMI 32.14 kg/m    BMI= Body mass index is 32.14 kg/m .  GENERAL: NAD.  HEENT: NC/AT.  CV: RRR. S1, S2.   NECK: No bruits.  PULM: Non-labored breathing.   Neurologic:  MENTAL STATUS: Alert, attentive. Speech is slow, with hesitation and clear word finding difficulties.  He does not seem to have any problems with comprehension. MoCA: 13/30 (previous score was 17.  Normal is 26 and above). Normal concentration. Fair fund of knowledge.   CRANIAL NERVES: Visual fields intact to confrontation. Pupils equally, round and reactive to light. Facial sensation and movement normal. EOM full. Hearing intact to conversation. Trapezius strength intact. Palate moves symmetrically. Tongue midline.  MOTOR: 5/5 in proximal and distal muscle groups of upper and lower extremities with brief testing  STATION AND GAIT: Casual gait is normal.  Left hand-dominant.    ASSESSMENT and PLAN:                                                      Assessment:    ICD-10-CM    1. Frontotemporal dementia (H)  G31.09 donepezil (ARICEPT) 5 MG tablet    F02.80 donepezil (ARICEPT) 10 MG tablet      2. Primary progressive aphasia (H)  G31.01     F02.80            Mr. Forrester is a pleasant 11-year-old man here for follow-up regarding neuropsych testing.  We reviewed the above findings and talked about the diagnosis and prognosis.   I agree with his plan to move forward with some therapies.  We will work on his short-term disability and then getting him a more permanent disability from a paperwork standpoint.  He has stopped driving and is okay with this.  Sleep has improved and it sounds like his mood is okay despite the cognitive changes.  Family is supportive.  We did discuss also doing a trial of Aricept.  I am not sure if this will lead to any improvement but and it is worth adding as a supportive measure.  We will have Robbin return to clinic in 3-4 months for follow-up.  He and his wife expressed understanding and agreement with the plan.    Plan:  -- Start Aricept (donepezil): 5mg at bedtime for 1 month and then increase to 10mg at bedtime.  -- I will reach out to Dr. Hanna about getting you set up with a .  -- I agree with your plan to get started with speech therapy and Occupational Therapy.  -- Keep up the good work with staying physically active!  -- Return to neurology clinic in 3-4 months.    Total Time: 60 minutes were spent with the patient and in chart review/documentation (as described above in Assessment and Plan)/coordinating the care on date of service.    Irma Jackson MD  Neurology    Dragon software used in the dictation of this note.                      Again, thank you for allowing me to participate in the care of your patient.        Sincerely,        Irma Jackson MD

## 2023-05-02 NOTE — PROGRESS NOTES
[facility]          OUTPATIENT SPEECH LANGUAGE PATHOLOGY LANGUAGE-COGNITION  EVALUATION  PLAN OF TREATMENT FOR OUTPATIENT REHABILITATION  (COMPLETE FOR INITIAL CLAIMS ONLY)  Patient's Last Name, First Name, M.I.  YOB: 1965  Robbin Forrester                        Provider s Name: [facility] Medical Record No.  [id number]     Onset Date:  04/14/23   Start of Care Date: 04/24/23   Type:     ___PT  __OT   _X_SLP    Medical Diagnosis:  Neurocognitive disorder ; Other speech disturbances   Speech Language Pathology Diagnosis:  Severe cognitive-linguistic deficits    Visits from SOC: 1                                        ________________________________________________________________________________  Plan of Treatment/Functional Goals:   Planned Therapy Interventions: Cognitive Treatment, Language        Communication Goals   1. Goal Identifier: 1. Memory       Goal Description: Robbin will participate in moderate level functional memory and attention tasks with >80% accuracy with strategy use by end of POC.       Target Date: 07/22/23   2. Goal Identifier: 2. Word finding       Goal Description: Robbin will name 10+ items in 2-3 categories for generative naming task with use of strategies.       Target Date: 07/22/23   3. Goal Identifier: 3. Cognitive-Linguistic Strategies       Goal Description: Robbin will learn and demonstrate use of x3 cognitive-linguistic strategies by end of POC.       Target Date: 07/22/23   4. Goal Identifier: 4. Testing       Goal Description: Robbin will complete cognition/word finding assessment to determine necessary goals by end of the POC.       Target Date: 07/22/23          Predicted Duration of Therapy Intervention (days/wks): 3 months, pending progress    Annalisa Owen SLP       I CERTIFY THE NEED FOR THESE SERVICES FURNISHED UNDER        THIS  PLAN OF TREATMENT AND WHILE UNDER MY CARE .             Physician Signature               Date    X_____________________________________________________                        Certification Date From:  04/24/23  Certification Date To:   07/22/23          Referring Physician:  Vic Hanna MD    Initial Assessment        See Epic Evaluation Start Of Care Date: 04/24/23

## 2023-05-02 NOTE — PROGRESS NOTES
Repeatable Battery for the Assessment of Neuropsychological Status Update   (RBANS  Update)  The Repeatable Battery for the Assessment of Neuropsychological Status Update (RBANS  Update) was administered to Robbin Forrester on 5/2/2023.  The RBANS Update was originally developed with a primary focus on assessment of dementia, and measures cognitive decline or improvement across these domains: immediate memory, visuospatial/constructional; language; attention; and delayed memory.  However, special group studies are available for Alzheimer's Disease, Vascular Dementia, HIV Dementia, [place]'s Disease, Parkinson's Disease, Depression, Schizophrenia, and Closed Head Injury.   The RBANS can be used by clinicians and neuropsychologists to help screen for deficits in acute-care settings; track recovery during rehabilitation; track progression of neurological disorders; and screen for neurocognitive status in adolescents.  This test is normed for ages 12-89.  The subtest normative data are presented in scaled score units that have a mean of 10 and a standard deviation of 3.          Total Score Scaled Score  Percentile Group       I.  Immediate memory    1.  List Learning   8  1   2.  Story Memory   2  1  Immediate Memory Index Score  = 40    II.  Visuospatial/Constructional    3.  Figure copy   9  1  4.  Line Orientation   N/A     N/A  Visuospatial/Constructional Index Score  = N/A    III.  Language     5.  Picture Naming   4     </= 2  6.  Semantic Fluency   5  1  Language Index Score = 40      INTERPRETATION OF TEST RESULTS: Portions of the RBANS was administered to Robbin Forrester on May 2, 2023. Compared to similar aged-adults, Robbin Forrester tested below average in the areas of list learning, story memory, figure copy, picture naming, and semantic fluency subtests of the RBANS assessment. Robbin Forrester presents with severe cognitive-linguistic deficits. Deficits were observed in the areas of immediate memory,  visuospatial/constructional, and language. His immediate memory was moderately impaired marked by difficulty repeating a story. A course of skilled speech therapy targeting the cognitive-linguistic goals is recommended to improve pt's ability to participate in daily conversations with family and friends, and to improve overall communication and quality of life.       TIME ADMINISTERING TEST: 25 minutes  TIME FOR INTERPRETATION AND PREPARATION OF REPORT: 20 minutes  TOTAL TIME: 45 minutes    Repeatable Battery for the Assessment of Neuropsychological Status Update (RBANS Update). Copyright   2012 [facility] Adapted and reproduced with permission. All rights reserved.

## 2023-05-08 ENCOUNTER — MYC MEDICAL ADVICE (OUTPATIENT)
Dept: NEUROLOGY | Facility: CLINIC | Age: 58
End: 2023-05-08

## 2023-05-11 ENCOUNTER — PATIENT OUTREACH (OUTPATIENT)
Dept: CARE COORDINATION | Facility: CLINIC | Age: 58
End: 2023-05-11
Payer: COMMERCIAL

## 2023-05-11 NOTE — PROGRESS NOTES
Clinic Care Coordination Contact  Care Team Conversations    Patient identified for care management outreach, however patient is not on a value based contract so cannot complete outreach. Will escalate to clinic staff if specific needs or resources are indicated.    CC SW consulted with care team for the pt, and discussed with the pt's wife. I was able to put in referrals for advocates and navigators to assist her with applying for county assistance and social security disability long term. Pt's wife has my contact information for future questions.     Jovanna Suarez, [facility]  Social Work Care Coordinator - [facility]  Care Coordination  [email or web address]  Cell Phone: [phone]  Gender pronouns: she/her  Employed by [employer]

## 2023-05-12 ENCOUNTER — TELEPHONE (OUTPATIENT)
Dept: NEUROLOGY | Facility: CLINIC | Age: 58
End: 2023-05-12
Payer: COMMERCIAL

## 2023-05-12 NOTE — TELEPHONE ENCOUNTER
[facility] Call Center    Phone Message    May a detailed message be left on voicemail: yes     Reason for Call: Other: Dr. Hanna is calling requesting a call back about putting in a referral for this Pt per Dr. Gurinder Jackson. Please call Dr. Hanna back to discuss     Action Taken: Message routed to:  Other: [facility] neurology    Travel Screening: Not Applicable

## 2023-05-12 NOTE — TELEPHONE ENCOUNTER
Dr. Jackson can you return call to Dr. Hanna? Direct phone # is [phone]    He states that he placed SW consult for patient, but would like to discuss patients dementia and prognosis with you further.     Anish Flores RN, BSN  [facility] Neurology

## 2023-05-15 ENCOUNTER — HOSPITAL ENCOUNTER (OUTPATIENT)
Dept: SPEECH THERAPY | Facility: REHABILITATION | Age: 58
Discharge: HOME | End: 2023-05-15
Payer: COMMERCIAL

## 2023-05-15 DIAGNOSIS — R41.3 MEMORY CHANGE: ICD-10-CM

## 2023-05-15 DIAGNOSIS — R47.01 APHASIA: ICD-10-CM

## 2023-05-15 DIAGNOSIS — G31.09 MAJOR NEUROCOGNITIVE DISORDER, DUE TO FRONTOTEMPORAL LOBAR DEGENERATION, WITHOUT BEHAVIORAL DISTURBANCE, MILD (H): Primary | ICD-10-CM

## 2023-05-15 DIAGNOSIS — F02.A0 MAJOR NEUROCOGNITIVE DISORDER, DUE TO FRONTOTEMPORAL LOBAR DEGENERATION, WITHOUT BEHAVIORAL DISTURBANCE, MILD (H): Primary | ICD-10-CM

## 2023-05-15 PROCEDURE — 92507 TX SP LANG VOICE COMM INDIV: CPT | Mod: GN

## 2023-05-23 ENCOUNTER — THERAPY VISIT (OUTPATIENT)
Dept: SPEECH THERAPY | Facility: REHABILITATION | Age: 58
End: 2023-05-23
Payer: COMMERCIAL

## 2023-05-23 DIAGNOSIS — R41.9 NEUROCOGNITIVE DISORDER: Primary | ICD-10-CM

## 2023-05-23 DIAGNOSIS — R47.89 OTHER SPEECH DISTURBANCES: ICD-10-CM

## 2023-05-23 DIAGNOSIS — R41.3 MEMORY CHANGE: ICD-10-CM

## 2023-05-23 DIAGNOSIS — R47.89 WORD FINDING DIFFICULTY: ICD-10-CM

## 2023-05-23 PROCEDURE — 92507 TX SP LANG VOICE COMM INDIV: CPT | Mod: GN

## 2023-05-25 ENCOUNTER — TRANSCRIBE ORDERS (OUTPATIENT)
Dept: OTHER | Age: 58
End: 2023-05-25

## 2023-05-25 DIAGNOSIS — R47.02: Primary | ICD-10-CM

## 2023-06-14 ENCOUNTER — TELEPHONE (OUTPATIENT)
Dept: NEUROLOGY | Facility: CLINIC | Age: 58
End: 2023-06-14
Payer: COMMERCIAL

## 2023-06-14 NOTE — TELEPHONE ENCOUNTER
Mindy called from [facility] pharmacy asking for clarification of donepezil prescriptions.  There are two rx's for 5 mg and 10 mg that were written on 4/28/23.    Which one to fill?    Routed to Dr uGrinder Jackson and [place] RN specialty pool.  Could not find another pool for RN's.  Please forward to correct pool if this is not correct.    Thank you.    Meri Barlow RN

## 2023-06-15 NOTE — TELEPHONE ENCOUNTER
Called and spoke with Mindy at [facility]  Robbin had not picked up the Aricept 5mg  He will start at 5mg for 30 days then 10mg ongoing for maintenance  No further questions at this time  Chapis Whitney CMA on 6/15/2023 at 9:58 AM

## 2023-06-26 ENCOUNTER — TELEPHONE (OUTPATIENT)
Dept: NEUROLOGY | Facility: CLINIC | Age: 58
End: 2023-06-26

## 2023-06-26 NOTE — TELEPHONE ENCOUNTER
Pt's spouse Shira has sent multiple CoverMyMeds messages to Dr Bustamante with no response. Writer cannot see the messages in Pt's chart. Shira  states that the Insurance company [employer] has faxed paperwork for Pt to get on disability, also with no response. Dr Bustamante pls call Shira, let her know if you have rec'd her CoverMyMeds messages, and direct her to who should be dealing with Disability request. [phone]

## 2023-07-11 ENCOUNTER — MYC MEDICAL ADVICE (OUTPATIENT)
Dept: NEUROLOGY | Facility: CLINIC | Age: 58
End: 2023-07-11
Payer: COMMERCIAL

## 2023-07-13 NOTE — TELEPHONE ENCOUNTER
We can see about starting the Leqembi.  Not sure if it is commercially available yet but I can look into it if they want.  My understanding is the cost is over $25,000 per year for this medication.    Thank you,  Dr. Jackson

## 2023-07-13 NOTE — TELEPHONE ENCOUNTER
Message per MD has been relayed to patient via [employer].    Perico Rodriguez, RN, BSN  [facility] Neurology

## 2023-07-13 NOTE — TELEPHONE ENCOUNTER
Dr. Jackson,    See patient myChart message related to questions about the medication, Leqembi, and whether this is a medication that is potentially prescribable for Robbin. I know this is a new medication and unsure if its prescribable through [facility], or other facilities.    Please advise.    Perico Rodriguez RN, BSN  [facility] Neurology

## 2023-07-21 ENCOUNTER — PATIENT OUTREACH (OUTPATIENT)
Dept: CARE COORDINATION | Facility: CLINIC | Age: 58
End: 2023-07-21
Payer: COMMERCIAL

## 2023-07-21 NOTE — PROGRESS NOTES
Clinic Care Coordination Contact  Care Team Conversations    Patient identified for care management outreach, however patient is not on a value based contract so cannot complete outreach. Will escalate to clinic staff if specific needs or resources are indicated.    SW CC consulted for resources, left the pt's wife a message with contact information.     Jovanna Suarez, [facility]  Social Work Care Coordinator - [facility]  Care Coordination  [email or web address]  Cell Phone: [phone]  Gender pronouns: she/her  Employed by [employer]

## 2023-07-26 NOTE — PROGRESS NOTES
ESTABLISHED PATIENT NEUROLOGY NOTE    DATE OF VISIT: 7/27/2023  MRN: [id number]  PATIENT NAME: Robbin Forrester  YOB: 1965    Chief Complaint   Patient presents with    neurocognitive disorder     No concerns     SUBJECTIVE:                                                        HISTORY OF PRESENT ILLNESS:  Robbin is here for follow up regarding Dementia    Robbin Forrester is a 58 year old male who is followed in the clinic for memory and speech issues.  He follows Dr. Jackson in the clinic, last seen 4/28/2023 to review neuropsych test results.  Per chart review of his last visit, Robbin discusses that at work, he has 6 different duties at work. When he gets to the end of the day, he tends to fall asleep with his dogs. When he is woken from sleep, he has trouble with slowness and difficulty with annunciation. His wife confirms that his speech is much worse when he first wakes. She has noticed word-finding difficulties in general for about 2 years, which correlates with the time he started working overnights. No problems with memory from their standpoint.  He has found it helpful to try and keep his dogs on a regular schedule, so that he is woken less frequently.  On his weekends, or if his wife is home, he is able to get much more restful sleep. His week begins on Sunday. He says he can be up 24-26 hours in a row.  He has not really noticed problems with remembering people's names.  No mention of problems with writing. He denies problems with driving, though his wife has noticed that he crosses over the [place] line sometimes, and roundabouts are very difficult for him to maneuver.  He does have an eye condition that affects his ability to see well during the day, so she wonders if it is related to this.  He feels that he sees better driving at night.  He has not had problems with getting lost driving. Robbin denies weakness or sensory changes.  He is not typically on headaches.  No problems with swallow.  No problems  controlling his bladder or bowels.  Wife has not noticed a change in his gait.  He has not had any work-up for the language issues as of yet.  No recent brain imaging.     They are in the process of working on getting his disability forms filled out.  His last day of work was 12 April.  He has stopped driving as recommended.  They did already contact his primary physician about referrals for speech therapy and occupational therapy.  His wife has been trying to keep him busy from a physical standpoint, son also helps.  Robbin tells me he feels better after working out.  He says sleep has been really good lately now that his schedule affords this.  He can get up to 8 hours of sleep which is much better than the 4 hours he was getting previously.  Wife asks about how to get a referral to social work through the [employer] system per there preference.  He would like to keep Dr. Hanna as his primary physician     07/27/23 Updates From This Visit:   Today Robbin Forrester is here with his wife, Shira, who is an independent historian and provides collateral information included below.  She reports that she had initial concerns over Robbin's speech.  A few years ago he was able to have in-depth clear conversations.  In the last few years his speech has become halting where he is unable to find the words he is trying to express.  He also had an increase in  his short-term memory loss.  She noticed that he was not paying bills and constantly losing things like his keys and phone.  Robbin states that he was in hockey and football growing up and has had 2 previous concussions.  He feels that his mood has been good/stable.  His sleep has greatly improved since he stopped working overnights.  He is currently getting 8 to 10 hours per night.  He denies any falls and is very steady on his feet.  Robbin states that he takes each one of his 3 dogs on a 1.5 mile walk each day.  He also attends the gym 2-3 times a week to work on  strength training.  He is dependent in his ADLs.  He reports that he has never gotten lost.  His wife agrees with all of his reports.  At his last appointment he was started on Aricept 10 mg daily.  Since this time they have not noticed any real improvements or declines.    Robbin reports having 1 or 2 episodes of left hand tremor lasting about 20 minutes before resolving. Spoke to his primary neurologist. We will continue to monitor. If symptoms continue we will do further work-up at that time.     Shira states that Robbin signed up for a research study through the [facility] [place].  They plan on attending September 5 through 7.    - No ETOH, nicotine or recreational drug use.     CURRENT MEDICATIONS:  donepezil (ARICEPT) 10 MG tablet, Take 1 tablet (10 mg) by mouth At Bedtime  donepezil (ARICEPT) 5 MG tablet, Take 1 tablet (5 mg) by mouth At Bedtime  MULTIPLE VITAMIN PO, Take by mouth daily    No current facility-administered medications on file prior to visit.    PAST MEDICAL HISTORY:  Patient  has no past medical history on file.    SURGICAL HISTORY:  He  has no past surgical history on file.    FAMILY AND SOCIAL HISTORY:  Reviewed, and he  reports that he has never smoked. He has never used smokeless tobacco. He reports that he does not currently use drugs.    Reviewed, and family history includes Cancer in his mother.    RECENT DIAGNOSTIC STUDIES:     Imaging:  EXAM: MR BRAIN W/O and W CONTRAST  DATE/TIME: 12/7/2021 11:22 AM  IMPRESSION:  INDICATION: Speech disturbance, unspecified type. Word finding difficulty.   1.  No acute intracranial process.  2.  Generalized brain atrophy and presumed microvascular ischemic changes as detailed above.    Neuropsychological testing  Per Dr. Bustamante's 4.14.23 Assessment:  Summary for Providers  ASSESSMENT:  Impairments (mostly moderate to severe) identified across all domains assessed including attention, speed of thinking, language, learning, memory, and executive functioning  Ironically,  his strongest and only intact score was on a language task, a measure of single word comprehension   Of note, memory deficits are retrieval based (I.e., he demonstrates some, albeit limited, benefit from cues)  Test scores together with his behavioral presentation, and history of symptoms reported by family (including progressive language decline with more recent memory difficulties) suggest that his presentation very likely represents a Primary Progressive Aphasia (likely semantic variant given impairments in confrontation naming, regularization errors in reading and writing and notable word finding difficulties in conversational speech but intact grammar and fluency)  It is notable, however, that his language deficits are pervasive and impacting even over-learned skills (sight word reading, speeding reading of simple words) as well as skills that would normally be persevered in Semantic variant PPA (repetition, comprehension). Unfortunately I suspect that this relates to progression of the condition that has been evident for upwards of 3-4 years now (inivanlly presented to Neurology in the fall of 2021 with difficulties evident 1-2 years prior).   The global nature of cognitive deficits outside of language (I.e., memory, attention, cognitive efficiency and executive functioning) are also likely explained by progression of PPA, which although initially impacts language primarily, progresses over time to impact all cognitive skills  Of note, concern was raised about third shift work and whether insufficient sleep and disrupted sleep patterns could account for his presentation.  While it is very likely that sleep disruption is exacerbating or exaggerating his difficulties, it is highly unlikely that poor sleep alone can account for this presentation.  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 While this may have been the case to some extent, he was able to see  adequately on a number of tasks (including a picture vocabulary task where he demonstrated average performance) and performed poorly on many tasks without a visual component. Reduced vision alone cannot account for his deficits on testing.  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 I'm very surprised that he has been able to continue work this long without mistakes and I suspect there may be errors that are not being detected  A referral to Social Work/ Care Management would be appropriate to assist with transition from work and general support with changes moving forward. As his PCP is in the \[facility]\"" network, he will need to pursue services there. If Dr. Gurinder Jackson is aware of another avenue into Care Management in the [facility] system, I would happily defer to her to facilitate that process.   Strongly recommend that he discontinue driving (given impairments in memory, speeded processing and executive functioning). He could consider a driving evaluation but as his cognition will continue to decline over time, it is unlikely he would be able to continue driving for very long.   It is recommended that his family continue to manage daily activities (I.e., cooking, finances, driving)  A trial of speech therapy may be beneficial to maximize language functioning.   Given the severity of his cognitive deficits, no future testing is recommended at this time unless questions of diagnosis or dispositional planning arise   While I think it highly unlikely that sleep alone could account for his presentation, if significant cognitive improvement is evident following a return to normal sleep patterns, re-evaluation is recommended (no need to wait for 1 year, just as soon as significant improvements have been present for at least a month) to determine if PPA continues to represent the most appropriate  "diagnosis     REVIEW OF SYSTEMS:                                                      10-point review of systems is negative except as mentioned above in HPI.    EXAM:                                                      Physical Exam:   Vitals: /71   Pulse 72   Ht 1.93 m (6' 4\")   Wt 119.7 kg (264 lb)   BMI 32.14 kg/m    BMI= Body mass index is 32.14 kg/m .  GENERAL: NAD.  HEENT: NC/AT.  PULM: Non-labored breathing.   Neurologic:  MENTAL STATUS: Alert, attentive. Speech is aphasic. Normal comprehension. Normal concentration. Adequate fund of knowledge.   CRANIAL NERVES: Visual fields intact to confrontation. Pupils equally, round and reactive to light. Facial sensation and movement normal. EOM full. Hearing intact to conversation. Trapezius strength intact. Palate moves symmetrically. Tongue midline.  MOTOR: 5/5 in proximal and distal muscle groups of upper and lower extremities. Tone and bulk normal.   SENSATION: Normal light touch   COORDINATION: Normal finger nose finger. Finger tapping normal. Knee heel shin normal.  STATION AND GAIT: Romberg Negative. Good postural reflexes. Casual gait Normal  left hand-dominant.      ASSESSMENT and PLAN:                                                      Assessment:    ICD-10-CM    1. Frontotemporal dementia (H)  G31.09     F02.80           Robbin Forrester is a 58 year old male who is followed in the clinic for memory and speech issues.  He follows Dr. Jackson in the clinic, last seen 4/28/2023 to review neuropsych test results.  At his last appointment they started Aricept.  Robbin is taking this medication and tolerating well without adverse effects.  He and his wife have not noticed any significant worsening of symptoms or improvement.  We will continue with his current regimen and follow-up in 6 months.  Robbin is participating [facility] a [facility].  We discussed interventions outlined below.  Robbin and his wife understand and agree " with this plan.      Plan:  --- Continue Aricept as prescribed:10 mg at bedtime.  --- Plan on follow up in the Neurology Clinic in 6 months.  --- Please feel free to reach out if you have any further questions or concerns.  --- Seek immediate medical attention if an emergency arises or if your health becomes progressively worse.     It was a pleasure meeting you today!       Total Time: 30 minutes were spent with the patient and in chart review/documentation (as described above in Assessment and Plan)/coordinating the care on date of service.     Vivienne Grullon, DNP, APRN, CNP  [facility]        [employer] software used in the dictation of this note.

## 2023-07-27 ENCOUNTER — OFFICE VISIT (OUTPATIENT)
Dept: NEUROLOGY | Facility: CLINIC | Age: 58
End: 2023-07-27
Payer: COMMERCIAL

## 2023-07-27 VITALS
WEIGHT: 264 LBS | DIASTOLIC BLOOD PRESSURE: 71 MMHG | HEART RATE: 72 BPM | SYSTOLIC BLOOD PRESSURE: 121 MMHG | BODY MASS INDEX: 32.15 KG/M2 | HEIGHT: 76 IN

## 2023-07-27 DIAGNOSIS — G31.09 FRONTOTEMPORAL DEMENTIA (H): Primary | ICD-10-CM

## 2023-07-27 DIAGNOSIS — F02.80 FRONTOTEMPORAL DEMENTIA (H): Primary | ICD-10-CM

## 2023-07-27 PROCEDURE — 99214 OFFICE O/P EST MOD 30 MIN: CPT

## 2023-07-27 NOTE — LETTER
7/27/2023         RE: Robbin Forrester  [address] [facility] [address]        Dear Colleague,    Thank you for referring your patient, Robbin Forrester, to the [facility] [place]. Please see a copy of my visit note below.    ESTABLISHED PATIENT NEUROLOGY NOTE    DATE OF VISIT: 7/27/2023  MRN: [id number]  PATIENT NAME: Robbin Forrester  YOB: 1965    Chief Complaint   Patient presents with     neurocognitive disorder     No concerns     SUBJECTIVE:                                                        HISTORY OF PRESENT ILLNESS:  Robbin is here for follow up regarding Dementia    Robbin Forrester is a 58 year old male who is followed in the clinic for memory and speech issues.  He follows Dr. Jackson in the clinic, last seen 4/28/2023 to review neuropsych test results.  Per chart review of his last visit, Robbin discusses that at work, he has 6 different duties at work. When he gets to the end of the day, he tends to fall asleep with his dogs. When he is woken from sleep, he has trouble with slowness and difficulty with annunciation. His wife confirms that his speech is much worse when he first wakes. She has noticed word-finding difficulties in general for about 2 years, which correlates with the time he started working overnights. No problems with memory from their standpoint.  He has found it helpful to try and keep his dogs on a regular schedule, so that he is woken less frequently.  On his weekends, or if his wife is home, he is able to get much more restful sleep. His week begins on Sunday. He says he can be up 24-26 hours in a row.  He has not really noticed problems with remembering people's names.  No mention of problems with writing. He denies problems with driving, though his wife has noticed that he crosses over the [place] line sometimes, and roundabouts are very difficult for him to maneuver.  He does have an eye condition that affects his ability to see well during the day, so she  wonders if it is related to this.  He feels that he sees better driving at night.  He has not had problems with getting lost driving. Robbin denies weakness or sensory changes.  He is not typically on headaches.  No problems with swallow.  No problems controlling his bladder or bowels.  Wife has not noticed a change in his gait.  He has not had any work-up for the language issues as of yet.  No recent brain imaging.     They are in the process of working on getting his disability forms filled out.  His last day of work was 12 April.  He has stopped driving as recommended.  They did already contact his primary physician about referrals for speech therapy and occupational therapy.  His wife has been trying to keep him busy from a physical standpoint, son also helps.  Robbin tells me he feels better after working out.  He says sleep has been really good lately now that his schedule affords this.  He can get up to 8 hours of sleep which is much better than the 4 hours he was getting previously.  Wife asks about how to get a referral to social work through the [employer] system per there preference.  He would like to keep Dr. Hanna as his primary physician     07/27/23 Updates From This Visit:   Today Robbin Forrester is here with his wife, Shira, who is an independent historian and provides collateral information included below.  She reports that she had initial concerns over Robbin's speech.  A few years ago he was able to have in-depth clear conversations.  In the last few years his speech has become halting where he is unable to find the words he is trying to express.  He also had an increase in  his short-term memory loss.  She noticed that he was not paying bills and constantly losing things like his keys and phone.  Robbin states that he was in hockey and football growing up and has had 2 previous concussions.  He feels that his mood has been good/stable.  His sleep has greatly improved since he stopped working  overnights.  He is currently getting 8 to 10 hours per night.  He denies any falls and is very steady on his feet.  Robbin states that he takes each one of his 3 dogs on a 1.5 mile walk each day.  He also attends the gym 2-3 times a week to work on strength training.  He is dependent in his ADLs.  He reports that he has never gotten lost.  His wife agrees with all of his reports.  At his last appointment he was started on Aricept 10 mg daily.  Since this time they have not noticed any real improvements or declines.    Robbin reports having 1 or 2 episodes of left hand tremor lasting about 20 minutes before resolving. Spoke to his primary neurologist. We will continue to monitor. If symptoms continue we will do further work-up at that time.     Shira states that Robbin signed up for a research study through the [facility].  They plan on attending September 5 through 7.    - No ETOH, nicotine or recreational drug use.     CURRENT MEDICATIONS:  donepezil (ARICEPT) 10 MG tablet, Take 1 tablet (10 mg) by mouth At Bedtime  donepezil (ARICEPT) 5 MG tablet, Take 1 tablet (5 mg) by mouth At Bedtime  MULTIPLE VITAMIN PO, Take by mouth daily    No current facility-administered medications on file prior to visit.    PAST MEDICAL HISTORY:  Patient  has no past medical history on file.    SURGICAL HISTORY:  He  has no past surgical history on file.    FAMILY AND SOCIAL HISTORY:  Reviewed, and he  reports that he has never smoked. He has never used smokeless tobacco. He reports that he does not currently use drugs.    Reviewed, and family history includes Cancer in his mother.    RECENT DIAGNOSTIC STUDIES:     Imaging:  EXAM: MR BRAIN W/O and W CONTRAST  DATE/TIME: 12/7/2021 11:22 AM  IMPRESSION:  INDICATION: Speech disturbance, unspecified type. Word finding difficulty.   1.  No acute intracranial process.  2.  Generalized brain atrophy and presumed microvascular ischemic changes as detailed above.    Neuropsychological testing  Per   Dianna's 4.14.23 Assessment:  Summary for Providers  ASSESSMENT:  Impairments (mostly moderate to severe) identified across all domains assessed including attention, speed of thinking, language, learning, memory, and executive functioning  Ironically, his strongest and only intact score was on a language task, a measure of single word comprehension   Of note, memory deficits are retrieval based (I.e., he demonstrates some, albeit limited, benefit from cues)  Test scores together with his behavioral presentation, and history of symptoms reported by family (including progressive language decline with more recent memory difficulties) suggest that his presentation very likely represents a Primary Progressive Aphasia (likely semantic variant given impairments in confrontation naming, regularization errors in reading and writing and notable word finding difficulties in conversational speech but intact grammar and fluency)  It is notable, however, that his language deficits are pervasive and impacting even over-learned skills (sight word reading, speeding reading of simple words) as well as skills that would normally be persevered in Semantic variant PPA (repetition, comprehension). Unfortunately I suspect that this relates to progression of the condition that has been evident for upwards of 3-4 years now (initallly presented to Neurology in the fall of 2021 with difficulties evident 1-2 years prior).   The global nature of cognitive deficits outside of language (I.e., memory, attention, cognitive efficiency and executive functioning) are also likely explained by progression of PPA, which although initially impacts language primarily, progresses over time to impact all cognitive skills  Of note, concern was raised about third shift work and whether insufficient sleep and disrupted sleep patterns could account for his presentation.  While it is very likely that sleep disruption is exacerbating or exaggerating his  difficulties, it is highly unlikely that poor sleep alone can account for this presentation.  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 While this may have been the case to some extent, he was able to see adequately on a number of tasks (including a picture vocabulary task where he demonstrated average performance) and performed poorly on many tasks without a visual component. Reduced vision alone cannot account for his deficits on testing.  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 I'm very surprised that he has been able to continue work this long without mistakes and I suspect there may be errors that are not being detected  A referral to Social Work/ Care Management would be appropriate to assist with transition from work and general support with changes moving forward. As his PCP is in the [facility] network, he will need to pursue services there. If Dr. Gurinder Jackson is aware of another avenue into Care Management in the [facility] system, I would happily defer to her to facilitate that process.   Strongly recommend that he discontinue driving (given impairments in memory, speeded processing and executive functioning). He could consider a driving evaluation but as his cognition will continue to decline over time, it is unlikely he would be able to continue driving for very long.   It is recommended that his family continue to manage daily activities (I.e., cooking, finances, driving)  A trial of speech therapy may be beneficial to maximize language functioning.   Given the severity of his cognitive deficits, no future testing is recommended at this time unless questions of diagnosis or dispositional planning arise   While I think it highly unlikely that sleep alone could account for his presentation, if significant cognitive improvement is  "evident following a return to normal sleep patterns, re-evaluation is recommended (no need to wait for 1 year, just as soon as significant improvements have been present for at least a month) to determine if PPA continues to represent the most appropriate diagnosis     REVIEW OF SYSTEMS:                                                      10-point review of systems is negative except as mentioned above in HPI.    EXAM:                                                      Physical Exam:   Vitals: /71   Pulse 72   Ht 1.93 m (6' 4\")   Wt 119.7 kg (264 lb)   BMI 32.14 kg/m    BMI= Body mass index is 32.14 kg/m .  GENERAL: NAD.  HEENT: NC/AT.  PULM: Non-labored breathing.   Neurologic:  MENTAL STATUS: Alert, attentive. Speech is aphasic. Normal comprehension. Normal concentration. Adequate fund of knowledge.   CRANIAL NERVES: Visual fields intact to confrontation. Pupils equally, round and reactive to light. Facial sensation and movement normal. EOM full. Hearing intact to conversation. Trapezius strength intact. Palate moves symmetrically. Tongue midline.  MOTOR: 5/5 in proximal and distal muscle groups of upper and lower extremities. Tone and bulk normal.   SENSATION: Normal light touch   COORDINATION: Normal finger nose finger. Finger tapping normal. Knee heel shin normal.  STATION AND GAIT: Romberg Negative. Good postural reflexes. Casual gait Normal  left hand-dominant.      ASSESSMENT and PLAN:                                                      Assessment:    ICD-10-CM    1. Frontotemporal dementia (H)  G31.09     F02.80           Robbin Forrester is a 58 year old male who is followed in the clinic for memory and speech issues.  He follows Dr. Jackson in the clinic, last seen 4/28/2023 to review neuropsych test results.  At his last appointment they started Aricept.  Robbin is taking this medication and tolerating well without adverse effects.  He and his wife have not noticed any significant worsening of " symptoms or improvement.  We will continue with his current regimen and follow-up in 6 months.  Robbin is participating in a research study through the [facility] in September.  We discussed interventions outlined below.  Robbin and his wife understand and agree with this plan.      Plan:  --- Continue Aricept as prescribed:10 mg at bedtime.  --- Plan on follow up in the Neurology Clinic in 6 months.  --- Please feel free to reach out if you have any further questions or concerns.  --- Seek immediate medical attention if an emergency arises or if your health becomes progressively worse.     It was a pleasure meeting you today!       Total Time: 30 minutes were spent with the patient and in chart review/documentation (as described above in Assessment and Plan)/coordinating the care on date of service.     Vivienne Grullon DNP, APRN, CNP  [facility]        [employer] software used in the dictation of this note.      Again, thank you for allowing me to participate in the care of your patient.        Sincerely,        WOJCIECH Mcallister CNP

## 2023-07-27 NOTE — NURSING NOTE
Chief Complaint   Patient presents with    neurocognitive disorder     No concerns     Meri Rey on 7/27/2023 at 12:30 PM

## 2023-07-27 NOTE — PATIENT INSTRUCTIONS
Plan:  --- Continue Aricept as prescribed:10 mg at bedtime.  --- Plan on follow up in the Neurology Clinic in 6 months.  --- Please feel free to reach out if you have any further questions or concerns.  --- Seek immediate medical attention if an emergency arises or if your health becomes progressively worse.     It was a pleasure meeting you today!

## 2023-07-31 ENCOUNTER — PATIENT OUTREACH (OUTPATIENT)
Dept: CARE COORDINATION | Facility: CLINIC | Age: 58
End: 2023-07-31

## 2023-07-31 NOTE — PROGRESS NOTES
Clinic Care Coordination Contact  Care Team Conversations    Patient identified for care management outreach, however patient is not on a value based contract so cannot complete outreach. Will escalate to clinic staff if specific needs or resources are indicated.      Received call back and VM from Shira while out of the office and 2x this morning. Returned call and discussed numerous different resources. Referral placed for [place] [facility] for waiver and PCA services. [facility] also discussed insurance per the pt's wife's request, and submitted a referral for [facility] to outreach to them. We also discussed transportation options for them outside of [employer], as well as options for grants to update their house to be more accessible for the pt as his abilities decline from his diagnosis. Pt's wife gave verbal permission for an email to be sent to her.     Jovanna Suarez, ELIDA  Social Work Care Coordinator - [facility]  Care Coordination  [email or web address]  Cell Phone: [phone]  Gender pronouns: she/her  Employed by [employer]

## 2023-08-09 ENCOUNTER — THERAPY VISIT (OUTPATIENT)
Dept: OCCUPATIONAL THERAPY | Facility: REHABILITATION | Age: 58
End: 2023-08-09
Payer: COMMERCIAL

## 2023-08-09 DIAGNOSIS — Z78.9 IMPAIRED INSTRUMENTAL ACTIVITIES OF DAILY LIVING: Primary | ICD-10-CM

## 2023-08-09 PROCEDURE — 97165 OT EVAL LOW COMPLEX 30 MIN: CPT | Mod: GO | Performed by: OCCUPATIONAL THERAPIST

## 2023-08-09 NOTE — PROGRESS NOTES
"OCCUPATIONAL THERAPY EVALUATION  Type of Visit: Evaluation    See electronic medical record for Abuse and Falls Screening details.    Presenting condition or subjective complaint:   Per visit note from visit with Dr. Loyda Bustamante, \"Mr. Forrester is a 57 year old, left-handed, White male presenting with family concerns about progressive language disturbance. He was referred for a neurocognitive evaluation by his neurologist, Dr. Gurinder Jackson from [facility] to assist with differential diagnosis and care planning.\" Patient followed up with his neurologist and was diagnosed with Frontotemporal dementia-Primary progressive aphasia. Patient is here with his wife today for OT evaluation.      Date of onset: 05/25/23 (date of referral to OT)      Prior Level of Function  Transfers: Independent  Ambulation: Independent  ADL: Independent  IADL: Driving, Finances, Housekeeping, Laundry, Meal preparation, Medication management, Work, Yard work    Living Environment  Social support:   wife, friends and family  Type of home:   1 level with basement  Stairs to enter the home:       3  Ramp:   no  Stairs inside the home:       12  Help at home:   only his wife  Equipment owned:       Employment:    full time as a ([employer] Patient is on short term disability until October.  Hobbies/Interests:   dogs, movies and eating out    Patient goals for therapy:   He would like to return to work and is hoping for a work evaluation. (Referred him back to neurologist for this)    Pain assessment: Pain denied     Objective     Cognitive Status Examination: Patient had neuropsych evaluation with visit note stating, \"Impairments (mostly moderate to severe) identified across all domains assessed including attention, speed of thinking, language, learning, memory, and executive functioning\". The note goes on to state, \"Strongly recommend that he retire on disability. I'm very surprised that he has " "been able to continue work this long without mistakes and I suspect there may be errors that are not being detected. Strongly recommend that he discontinue driving (given impairments in memory, speeded processing and executive functioning). He could consider a driving evaluation but as his cognition will continue to decline over time, it is unlikely he would be able to continue driving for very long. 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 Patient walks to the grocery store to pick a few groceries. They have groceries delivered.  Home Management: Patient is still independent with laundry and cleaning but wife reports that he is lacking thoroughness and completion of tasks.  Financial Management: Wife is now handling the finances. Patient is no longer able to handle his own finances  Communication/Computer Use: Independent  Community Mobility: Wife is now " driving and they are setting up [employer]  Care of Others: N/A  Medication Management: Patient is taking one medication(Aricept) and is independent with this.     Assessment & Plan   CLINICAL IMPRESSIONS  Medical Diagnosis: Semantic dysphasia    Treatment Diagnosis: decreased ADL and IADL function    Impression/Assessment: Patient has progressive `neurological disorder. He has been working with speech therapy since 4-24-23 for cognitive strategies. Patient and his wife report that he is independent with BADL's at this time. His wife has taken over some of the IADL's as patient is no longer safe to perform these.(Cooking, finances). Patient is not driving. He does take his own medication (only on one medication-Aricept) and I did recommend he use a weekly medication box or a [facility]-[employer]-[facility] system. Patient and his wife are asking about the process for getting a work evaluation to see if he could return to work in some capacity. I suggested they discuss that with the neurologist.     Clinical Decision Making (Complexity):  Assessment of Occupational Performance: 5 or more Performance Deficits  Occupational Performance Limitations: driving and community mobility, health management and maintenance, home establishment and management, meal preparation and cleanup, shopping, sleep, work, and leisure activities  Clinical Decision Making (Complexity): Low complexity    PLAN OF CARE  Treatment Interventions:  Evaluation only.     Long Term Goals: N/A         Frequency of Treatment:  evaluation only  Duration of Treatment:   N/A    Recommended Referrals to Other Professionals: none    Education Assessment:       Risks and benefits of evaluation/treatment have been explained.   Patient/Family/caregiver agrees with Plan of Care.     Evaluation Time:    OT Eval, Low Complexity Minutes (67386): 40       Signing Clinician: Elke Pandey OT

## 2023-09-20 NOTE — PROGRESS NOTES
DISCHARGE  Reason for Discharge: Patient has met all goals.    Equipment Issued: none    Discharge Plan: N/A    Referring Provider:  Vic Hanna MD       08/09/23 0500   Appointment Info   Treating Provider Ashley Pandey OTR/L   Visits Used 0   Medical Diagnosis Semantic dysphasia   OT Tx Diagnosis decreased ADL and IADL function   Progress Note/Certification   Onset of Illness/Injury or Date of Surgery 05/25/23  (date of referral to OT)   Eval/Assessments   OT Eval, Low Complexity Minutes (78134) 40   Plan   Plan for next session no treatment needed at this time   Total Session Time   Total Treatment Time (sum of timed and untimed services) 40

## 2023-09-27 NOTE — PROGRESS NOTES
DISCHARGE  Reason for Discharge: Patient was seen for x2 therapy sessions.  No further appointments attended/scheduled so speech therapy will be discontinued at this time.    Equipment Issued: NA    Discharge Plan: Patient to continue home program.    Referring Provider:  Vic Hanna       05/23/23 0500   Appointment Info   Treating Provider Annalisa Owen MS CCC-SLP   Visits Used 3   Medical Diagnosis Neurocognitive disorder ; Other speech disturbances   SLP Tx Diagnosis Severe cognitive-linguistic deficits   Precautions/Limitations No known precautions/limitations   Quick Adds Certification   Session Number   Session Number 3   Progress Note/Certification   Start Of Care Date 04/24/23   Onset Of Illness/injury Or Date Of Surgery 04/14/23   Therapy Frequency 1x/week   Predicted Duration 3 months, pending progress   Certification date from 04/24/23   Certification date to 07/22/23   Progress Note Due Date 07/22/23   Recertification Due 07/22/23       Present No    ID Pt speaks English   Subjective Report   Subjective Report SLP: Patient arrived 8 minutes late for therapy session with his wife who joined in the treatment room to learn home program recommendations. Patient reports there is nothing new and that he does not feel he had occurances in the last week with word finding or memory difficulties.   Treatment Interventions (SLP)   Treatment Interventions Treatment Speech/Lang/Voice   Treatment Speech/Lang/Voice   Treatment of Speech, Language, Voice Communication&/or Auditory Processing (93886) 35 Minutes   Skilled Intervention Provided written and verbal information on.;Modeled compensatory strategies;Other   Patient Response/Progress Patient responded well to cueing providing examples/list of options, semantic cueing and phonemic cueing   Treatment Detail SLP faciliated treatment session to target word finding and memory. SLP presented a box with a given category  and initial letter and asked patient to tell her an item that would be appropriate in the box given those features. Independently, Robbin was able to name item in 1/10 trials. With moderate-maximum cueing from SLP, patient was able to provide an example that fit the category and letter in 4 out of 10 trials. With maximum cueing, patient was unable to provide a response in 5 out of 10 trials. SLP discussed word finding strategies of association, visualizing, first letter, etc. These strategies assisted patient in times were word finding difficulty occured. SLP then asked patient to name three items in a given category. Robbin was able to do so in 3 out of 4 trials with mild-moderate cueing. Patient and wife verbalized understanding of treatment strategies and recommendations.   Progress Good for stated goals. Please see above for further information.   Speech/Lang/Voice 1 - Details Memory & Word Finding   Education   Learner/Method Patient;Family   Education Comments SLP provided patient education on treatment strategies and home program recommendations. Patient and his brother verbalized understanding.   Plan   Home program Complete homework provided   Updates to plan of care Continue current plan of care   Plan for next session Continue current plan of care   Total Session Time   Total Treatment Time (sum of timed and untimed services) 35   Cognitive/Communication Goal 1   Goal Identifier 1. Memory   Goal Description Robbin will participate in moderate level functional memory and attention tasks with >80% accuracy with strategy use by end of POC.   Target Date 07/22/23   Cognitive/Communication Goal 2   Goal Identifier 2. Word finding   Goal Description Robbin will name 10+ items in 2-3 categories for generative naming task with use of strategies.   Target Date 07/22/23   Cognitive/Communication Goal 3   Goal Identifier 3. Cognitive-Linguistic Strategies   Goal Description Robbin will learn and demonstrate use of x3  cognitive-linguistic strategies by end of POC.   Target Date 07/22/23   Cognitive/Communication Goal 4   Goal Identifier 4. Testing   Goal Description Robbin will complete cognition/word finding assessment to determine necessary goals by end of the POC.   Target Date 07/22/23   Cognitive/Communication Goals   Cognitive/Communication Goals 1;2;3;4   General Information   Medical Diagnosis Neurocognitive disorder ; Other speech disturbances   Onset Of Illness/injury Or Date Of Surgery 04/14/23   Clinical Impression   SLP Diagnosis severe cognitive-linguistic deficits

## 2023-10-26 ENCOUNTER — DOCUMENTATION ONLY (OUTPATIENT)
Dept: OTHER | Facility: CLINIC | Age: 58
End: 2023-10-26
Payer: COMMERCIAL

## 2024-03-03 ENCOUNTER — HEALTH MAINTENANCE LETTER (OUTPATIENT)
Age: 59
End: 2024-03-03

## 2024-03-04 ENCOUNTER — LAB REQUISITION (OUTPATIENT)
Dept: LAB | Facility: CLINIC | Age: 59
End: 2024-03-04

## 2024-03-04 DIAGNOSIS — Z13.1 ENCOUNTER FOR SCREENING FOR DIABETES MELLITUS: ICD-10-CM

## 2024-03-04 DIAGNOSIS — Z12.5 ENCOUNTER FOR SCREENING FOR MALIGNANT NEOPLASM OF PROSTATE: ICD-10-CM

## 2024-03-04 PROCEDURE — 80048 BASIC METABOLIC PNL TOTAL CA: CPT | Performed by: FAMILY MEDICINE

## 2024-03-04 PROCEDURE — G0103 PSA SCREENING: HCPCS | Performed by: FAMILY MEDICINE

## 2024-03-05 LAB
ANION GAP SERPL CALCULATED.3IONS-SCNC: 10 MMOL/L (ref 7–15)
BUN SERPL-MCNC: 11.7 MG/DL (ref 6–20)
CALCIUM SERPL-MCNC: 9 MG/DL (ref 8.6–10)
CHLORIDE SERPL-SCNC: 103 MMOL/L (ref 98–107)
CREAT SERPL-MCNC: 1.09 MG/DL (ref 0.67–1.17)
DEPRECATED HCO3 PLAS-SCNC: 27 MMOL/L (ref 22–29)
EGFRCR SERPLBLD CKD-EPI 2021: 80 ML/MIN/1.73M2
GLUCOSE SERPL-MCNC: 136 MG/DL (ref 70–99)
POTASSIUM SERPL-SCNC: 4.1 MMOL/L (ref 3.4–5.3)
PSA SERPL DL<=0.01 NG/ML-MCNC: 0.85 NG/ML (ref 0–3.5)
SODIUM SERPL-SCNC: 140 MMOL/L (ref 135–145)

## 2024-03-07 DIAGNOSIS — G31.09 FRONTOTEMPORAL DEMENTIA (H): ICD-10-CM

## 2024-03-07 DIAGNOSIS — F02.80 FRONTOTEMPORAL DEMENTIA (H): ICD-10-CM

## 2024-03-07 NOTE — TELEPHONE ENCOUNTER
Refill request for: Donepazil    Directions: Take 1 tablet at bedtime     LOV: 7/27/23  NOV: 3/20/24    30 day supply with 1 refills Medication T'd for review and signature

## 2024-03-08 RX ORDER — DONEPEZIL HYDROCHLORIDE 10 MG/1
10 TABLET, FILM COATED ORAL AT BEDTIME
Qty: 30 TABLET | Refills: 1 | Status: SHIPPED | OUTPATIENT
Start: 2024-03-08 | End: 2024-05-14

## 2024-03-13 ENCOUNTER — PATIENT OUTREACH (OUTPATIENT)
Dept: CARE COORDINATION | Facility: CLINIC | Age: 59
End: 2024-03-13

## 2024-03-13 ENCOUNTER — MYC MEDICAL ADVICE (OUTPATIENT)
Dept: FAMILY MEDICINE | Facility: CLINIC | Age: 59
End: 2024-03-13

## 2024-03-13 ENCOUNTER — VIRTUAL VISIT (OUTPATIENT)
Dept: FAMILY MEDICINE | Facility: CLINIC | Age: 59
End: 2024-03-13
Payer: COMMERCIAL

## 2024-03-13 DIAGNOSIS — F02.80 ALZHEIMER'S DISEASE (H): ICD-10-CM

## 2024-03-13 DIAGNOSIS — F02.80 FRONTOTEMPORAL DEMENTIA (H): Primary | ICD-10-CM

## 2024-03-13 DIAGNOSIS — G31.09 FRONTOTEMPORAL DEMENTIA (H): Primary | ICD-10-CM

## 2024-03-13 DIAGNOSIS — G30.9 ALZHEIMER'S DISEASE (H): ICD-10-CM

## 2024-03-13 PROBLEM — H26.9 CATARACT OF BOTH EYES, UNSPECIFIED CATARACT TYPE: Status: ACTIVE | Noted: 2024-03-13

## 2024-03-13 PROBLEM — G47.26 SLEEP DISORDER, CIRCADIAN, SHIFT WORK TYPE: Status: ACTIVE | Noted: 2024-03-13

## 2024-03-13 PROBLEM — R47.01 SEMANTIC APHASIA: Status: ACTIVE | Noted: 2024-03-13

## 2024-03-13 PROCEDURE — 99214 OFFICE O/P EST MOD 30 MIN: CPT | Mod: 95 | Performed by: PHYSICIAN ASSISTANT

## 2024-03-13 ASSESSMENT — ACTIVITIES OF DAILY LIVING (ADL): DEPENDENT_IADLS:: INDEPENDENT

## 2024-03-13 NOTE — PROGRESS NOTES
Robbin is a 58 year old who is being evaluated via a billable video visit.    How would you like to obtain your AVS? MyChart  If the video visit is dropped, the invitation should be resent by: Text to cell phone: [phone]  Will anyone else be joining your video visit? Yes: family. How would they like to receive their invitation? Text to cell phone: [phone]      Assessment & Plan     1. Frontotemporal dementia (H)  2. Alzheimer's disease (H)  New patient to [facility].  Wife states there is some debate as to whether he has frontotemporal dementia or Alzheimer's disease.  Has been following with neurology.  Had difficulty contacting people at an [facility] for care management.  Therefore they are switching to see us.  Wife has significant safety concerns, see below, wandering outside the home.  She feels like his functioning has gotten significantly worse in the past week.  Discussed trying Ensure to see if he will eat that.  Referral placed for home care for home safety evaluation and other needs.  Referral also made for care management and  to contact wife.  Likely needs increase in care hours.  Wife may be interested in a long-term care facility for him?  In the meantime, I discussed that if she feels like she is unable to care for him, or if he goes for a significant amount of time without eating or drinking, she should bring him into the emergency room.  They have an appointment with neurology on 3/20/2024.  - Home Care Referral  - Primary Care - Care Coordination Referral; Future          Subjective   Robbin is a 58 year old, presenting for the following health issues:  Dementia and Anorexia (Poor appetite )        3/13/2024     9:44 AM   Additional Questions   Roomed by hser   Accompanied by wife     History of Present Illness       Reason for visit:  Not eating well, dementia,    He eats 4 or more servings of fruits and vegetables daily.He consumes 1 sweetened beverage(s) daily.He exercises  with enough effort to increase his heart rate 20 to 29 minutes per day.  He exercises with enough effort to increase his heart rate 4 days per week.   He is taking medications regularly.     Here with wife, wife has POA  Conversation with wife today  Seeing Neurologist.  Hx PCP at [facility], hard to contact people there, switching to [facility]    Needs  consult  Safety concerns at home, a lot of sundowning in last 48 hours, wandering outside  Had to drive around to find him and pick him up  Not eating well, nothing since 24 hours ago    3 large dogs at home, one dog bit caregiver, caregiver won't come back -- going to return dog, so nurse can come back to home  Has 4 hours of care now -- not enough, needs 24 hour care?    Early onset dementia, progressing a lot  Pt takes pill bottle - taking??  FTD vs. Alzheimer's dementia  Sees neurology regularly.  Saw neurology at [facility] briefly for diagnostic assessment.          Objective    Vitals - Patient Reported  Systolic (Patient Reported):  (unable to)  Diastolic (Patient Reported):  (unable to)  Weight (Patient Reported):  (unable to)  Height (Patient Reported):  (unable to)  SpO2 (Patient Reported):  (unable to)  Pulse (Patient Reported):  (unable to)  Pain Score: No Pain (1)  Pain Loc: Other - see comment        Physical Exam   GENERAL: alert and no distress  EYES: Eyes grossly normal to inspection.  No discharge or erythema, or obvious scleral/conjunctival abnormalities.  RESP: No audible wheeze, cough, or visible cyanosis.    SKIN: Visible skin clear. No significant rash, abnormal pigmentation or lesions.  NEURO: Cranial nerves grossly intact.  Mentation and speech appropriate for age.  PSYCH: Appropriate affect, tone, and pace of words          Video-Visit Details    Type of service:  Video Visit   Originating Location (pt. Location): Home    Distant Location (provider location):  On-site  Platform used for Video Visit: Jomar Gil  Electronically by: Anaid Mishra PA-C      Prior to immunization administration, verified patients identity using patient s name and date of birth. Please see Immunization Activity for additional information.     Screening Questionnaire for Adult Immunization    Are you sick today?   No   Do you have allergies to medications, food, a vaccine component or latex?   No   Have you ever had a serious reaction after receiving a vaccination?   No   Do you have a long-term health problem with heart, lung, kidney, or metabolic disease (e.g., diabetes), asthma, a blood disorder, no spleen, complement component deficiency, a cochlear implant, or a spinal fluid leak?  Are you on long-term aspirin therapy?   No   Do you have cancer, leukemia, HIV/AIDS, or any other immune system problem?   No   Do you have a parent, brother, or sister with an immune system problem?   No   In the past 3 months, have you taken medications that affect  your immune system, such as prednisone, other steroids, or anticancer drugs; drugs for the treatment of rheumatoid arthritis, Crohn s disease, or psoriasis; or have you had radiation treatments?   No   Have you had a seizure, or a brain or other nervous system problem?   No   During the past year, have you received a transfusion of blood or blood    products, or been given immune (gamma) globulin or antiviral drug?   No   For women: Are you pregnant or is there a chance you could become       pregnant during the next month?   No   Have you received any vaccinations in the past 4 weeks?   No     Immunization questionnaire answers were all negative.      Patient instructed to remain in clinic for 15 minutes afterwards, and to report any adverse reactions.     Screening performed by Laurent Ceballos MA on 3/13/2024 at 9:51 AM.

## 2024-03-13 NOTE — LETTER
[facility]  Patient Centered Plan of Care  About Me:        Patient Name:  Robbin Forrester    YOB: 1965  Age:         58 year old   Alla MRN:    [id number] Telephone Information:  Home Phone [phone]   Mobile [phone]   Home Phone [phone]       Address:  [address] [address] Email address:  [email or web address]      Emergency Contact(s)    Name Relationship Lgl Grd Work Phone Home Phone Mobile Phone   1. MARCO FORRESTER Spouse No  [phone]   2. JACQUES FORRESTER Brother No  [phone]    3. LUIS E FORRESTER Son No   [phone]           Primary language:  English     needed? No   [place] [facility]:  [phone] op. 1  Other communication barriers:No data recorded  Preferred Method of Communication:     Current living arrangement: I live alone    Mobility Status/ Medical Equipment: Independent        Health Maintenance  Health Maintenance Reviewed: No data recorded    My Access Plan  Medical Emergency 911   Primary Clinic Line [facility] [phone]   24 Hour Appointment Line [phone] or  [address] [place] [address] [phone]) (toll-free)   24 Hour Nurse Line [phone] (toll-free)   Preferred Urgent Care No data recorded   Preferred Hospital [facility]  [phone]     Preferred Pharmacy [facility] - [place], [place] - [address] Behavioral Health Crisis Line The National Suicide Prevention Lifeline at 1-928.190.1661 or Text/Call 608           My Care Team Members  Patient Care Team         Relationship Specialty Notifications Start End    Anaid Mishra PA-C PCP - General Family Medicine  3/13/24     Phone: [phone] Fax: [phone]         [address] [facility] [address]    Irma Fabian MD MD Surgery  10/4/21     Phone: [phone] Fax: [phone]         [address] [facility] [address]    Loyda Bustamante, PhD LP Assigned Behavioral Health  Provider   4/22/23     Phone: [phone] Fax: [phone]         [facility] [address] [facility] [address]    Vivienne Grullon APRN CNP Assigned Neuroscience Provider   1/25/24     Phone: [phone] Fax: [phone] [address] [facility] [address]    Korin Perdue MSW Lead Care Coordinator  Admissions 3/13/24                 My Care Plans  Self Management and Treatment Plan    Care Plan  Care Plan: Help At Home       Problem: Insufficient In-home support       Goal: Establish adequate home support       Start Date: 3/13/2024    This Visit's Progress: 30%    Priority: Medium    Note:     Barriers: income, insurance   Strengths: Supportive spouse  Patient expressed understanding of goal: Yes  Action steps to achieve this goal:  1. I will review private home care list and start services in next month.   2. I will engage [facility] for in home evaluation in next week.   3. I will engage with CC monthly and request additional support as needed.                                 Action Plans on File:                       Advance Care Plans/Directives:   Advanced Care Plan/Directives on file:   No    Discussed with patient/caregiver(s): No data recorded           My Medical and Care Information  Problem List   Patient Active Problem List   Diagnosis    Word finding difficulty    Cataract of both eyes, unspecified cataract type    Semantic aphasia    Sleep disorder, circadian, shift work type    Frontotemporal dementia (H)    Alzheimer's disease (H)      Current Medications and Allergies:  See printed Medication Report.    Care Coordination Start Date: 3/13/2024   Frequency of Care Coordination: No data recorded   Form Last Updated: 03/18/2024

## 2024-03-13 NOTE — LETTER
Income limited for MA  Income  Income-based Medical Assistance (MA) is for people with low incomes. These are the two main rules:    If your family s income is at or under 138% of the Federal Poverty Guidelines (FPG) ($20,120 for an individual; $41,400 for a family of four), you may qualify for income-based Medical Assistance ([place]).      https://[place].gov/dhs/people-we-serve/people-with-disabilities/services/home-community/programs-and-services/cadi-waiver.jsp

## 2024-03-13 NOTE — LETTER
Home care referral      Please review and let me know if able to accept.    Harvey Perdue, [facility]  Social Work Care [facility]   Phone: [phone]                                                                        Patient Information       Patient Name  Robbin See ([id number]) Legal Sex  Male   1965         Patient Demographics       Address  [address] [address] Phone  [phone] (Home)  [phone] (Work) *Preferred*  [phone] (Mobile) E-mail Address  [email or web address]         Basic Information       Date Of Birth  1965 Gender Identity  Male Race  White Ethnic Group  Not  or  Preferred Language  English       PCP and Center    Primary Care Provider  Phone Center     Anaid Mishra [phone] [address] [place] [address]         Patient Contacts       Name Relation Home Work Mobile        Madai See Spouse [phone]         Manuel See Brother [phone]           Katharine See Son   [phone]            Documents on File        Status Date Received Description         Coverage Information    Subscriber name: ELIZABETH SEEMARU NAVYA     Subscriber number: [id number] Group number: [id number]   Payer: MEDICA [13] Benefit plan: MEDICA VANTAGEPLUS FULLY INSURED [2270]               Plan Details     Component Group Net Aut Joel Ref Clsfr Level MOOP/Ded Patient Portion Limit Left Benefit Bucket Admission Group   Physician Office Visits    All Procedures --   Copay Table of Coverage: MEDICA-MEDICA VANTAGE* [[id number]] No copay/coinsurance table information available -- Copay Table of Plan: MEDICA VANTAGEPLUS FULLY INSURED [2270] No copay/coinsurance table information available             Documentation of Face to Face and Certification for Home Health Services     I attest that I saw or will see Robbin See on this date:  3/13/2024     This encounter with the patient was in whole, or in part, for medical condition,  which is the primary reason for Home Health Care:       Patient Active Problem List   Diagnosis    Word finding difficulty    Cataract of both eyes, unspecified cataract type    Semantic aphasia    Sleep disorder, circadian, shift work type    Frontotemporal dementia (H)    Alzheimer's disease (H)      I certify that, based on my findings, the following services are medically necessary Home Health Services: Skilled Nursing  Physical Therapy Disease management  Home Safety Assessment  Therapeutic Exercise     Additional services needed: Social Work       Further, I certify that my clinical findings support that this patient is homebound (i.e. absences from home require considerable and taxing effort and are for medical reasons or [employer]ondo[employer] services or infrequently or of short duration when for other reasons) related to:Patient gets lost or wanders due to cognitive impairments  Mental Health condition is exacerbated by exposure to crowds, unfamiliar environment or new stressful situations  Requires assistance of another person or specialized equipment is needed     Based on the above findings, I certify that this patient is confined to the home and needs intermittent skilled nursing care, physical therapy and/or speech therapy. The patient is under my care, and I have initiated the establishment of the plan of care. This patient will be followed by a physician who will periodically review the plan of care.        Physician/Provider to provide follow up care: Provider to follow patient: ISAEL MCNEIL [[id number]]        Please be aware that coverage of these services is subject to the terms and limitations of your health insurance plan.  Call member services at your health plan with any benefit or coverage questions.  _______________________________________________________________________  Authorized by:  Anaid Mishra PA-C       PLEASE EVALUATE AND TREAT (Evaluation timeline is 24 - 48 hrs. Please call  if there is need for a variance to this timeline).     Medications:         Current Outpatient Medications   Medication Sig Dispense Refill    donepezil (ARICEPT) 10 MG tablet Take 1 tablet (10 mg) by mouth at bedtime 30 tablet 1    donepezil (ARICEPT) 5 MG tablet Take 1 tablet (5 mg) by mouth At Bedtime 30 tablet 0    MULTIPLE VITAMIN PO Take by mouth daily          Problems:      Patient Active Problem List   Diagnosis    Word finding difficulty    Cataract of both eyes, unspecified cataract type    Semantic aphasia    Sleep disorder, circadian, shift work type    Frontotemporal dementia (H)    Alzheimer's disease (H)      Diet:  None        Code Status:    Code Status: Not on file     Allergies:  Cat hair extract             Order  Home Care Referral [9046.001] (Order [id number])  Referral Details    Referred By  Referred To   Anaid Mishra PA-C   [address] [place] [address]   Phone: [phone]   Fax: [phone]    Diagnoses: Frontotemporal dementia (H)   Alzheimer's disease (H)   Order: Home Care Referral            Anaid Mishra PA-C   [address] [place] [address]   Phone: [phone]   Fax: [phone]    Diagnoses: Frontotemporal dementia (H)   Alzheimer's disease (H)   Order: Primary Care - Care Coordination Referral       Comment:       Patient Name  Robbin Forrester MRN  [id number] Legal Sex  Male   1965     Patient Demographics    Address  [address] [place] [address] Phone  [phone] (Home)  [phone] (Work) *Preferred*  [phone] (Mobile) E-mail Address  [email or web address]     Base CPT Code (Reference Only)    Code CPT Chargeables   9046.001 9046      Existing Charges    Charge Line Charge Code Status Charge Trigger Charge Type   None          Order Information    Order Date/Time Release Date/Time Start Date/Time End Date/Time   24 11:11 AM None 3/13/2024 None     Order Details    Frequency Duration Priority Order Class   None None Routine: Next  available opening  Home Care     Order Providers    Authorizing Provider Encounter Provider   Anaid Mishra PA-C Battistini, Marissa L, PA-C     ABN Associated with this Order    There is no ABN associated with this order.                    Ordering Provider's NPI: [id number]  Aanid Mishra    Home Care Referral [[id number]]    Electronically signed by: Anaid Mishra PA-C on 03/13/24 1111 Status: Active   Ordering user: Anaid Mishra PA-C 03/13/24 1111   Cosigning events  Electronically cosigned by Sarai Fernandez MD 03/13/24 1140 for Ordering     Order History  Outpatient  Date/Time Action Taken User Additional Information   03/13/24 1111 Sign Anaid Mishra PA-C    03/13/24 1140 Cosign Sarai Fernandez MD      Order Questions    Question Answer   Reason for Referral: Skilled Nursing   Note: Must select at least one of Skilled Nursing, Physical Therapy, and/or Speech Therapy in addition to any other services.    Physical Therapy   Note: Must select at least one of Skilled Nursing, Physical Therapy, and/or Speech Therapy in addition to any other services.   Physical Therapy Eval and Treat for: Home Safety Assessment    Therapeutic Exercise   Skilled Nursing Eval and Treat for: Disease management   Additional Services Needed: Social Work   Is the patient homebound? Yes   Homebound Status (describe the functional limitations that support this patient is confined to his/her home. Medicaid recipients are not required to be homebound.): Patient gets lost or wanders due to cognitive impairments    Mental Health condition is exacerbated by exposure to crowds, unfamiliar environment or new stressful situations    Requires assistance of another person or specialized equipment is needed   I attest that I saw or will see the patient on this date: 3/13/2024   Provider to follow patient ISAEL MCNEIL KIRILL             Coverage Information    Subscriber name: MARU SEE      Subscriber number: [id number] Group number: [id number]   Payer: MEDICA [13] Benefit plan: MEDICA VANTAGEPLUS FULLY INSURED []               Plan Details     Component Group Net Aut Joel Ref Clsfr Level MOOP/Ded Patient Portion Limit Left Benefit Bucket Admission Group   Physician Office Visits    All Procedures --   Copay Table of Coverage: MEDICA-MEDICA VANTAGE* [[id number]] No copay/coinsurance table information available -- Copay Table of Plan: MEDICA VANTAGEPLUS FULLY INSURED [] No copay/coinsurance table information available             Patient Information    Patient Name  Robbin Forrester MRN  [id number] Gender Identity  Male   1965     Patient PCP Information    Provider PCP Type   Anaid Mishra PA-C General     Allergies    Cat Hair Extract Unknown     Your Current Medications Are    donepezil (ARICEPT) 10 MG tablet Take 1 tablet (10 mg) by mouth at bedtime   donepezil (ARICEPT) 5 MG tablet Take 1 tablet (5 mg) by mouth At Bedtime   MULTIPLE VITAMIN PO Take by mouth daily

## 2024-03-13 NOTE — LETTER
[facility] CARE COORDINATION  [address] [address]    March 18, 2024    Robbin Forrester  [address] [address]      Dear Robbin,    I am a clinic care coordinator who works with Anaid Mishra PA-C with the [facility]. I wanted to thank you for spending the time to talk with me.  Below is a description of clinic care coordination and how I can further assist you.       The clinic care coordination team is made up of a registered nurse, , financial resource worker and community health worker who understand the health care system. The goal of clinic care coordination is to help you manage your health and improve access to the health care system. Our team works alongside your provider to assist you in determining your health and social needs. We can help you obtain health care and community resources, providing you with necessary information and education. We can work with you through any barriers and develop a care plan that helps coordinate and strengthen the communication between you and your care team.  Our services are voluntary and are offered without charge to you personally.    Please feel free to contact me with any questions or concerns regarding care coordination and what we can offer.      We are focused on providing you with the highest-quality healthcare experience possible.    Sincerely,     Harvey Perdue, [facility]  Social Work Care Cooridinator   [facility]   Phone: [phone]

## 2024-03-14 ENCOUNTER — PATIENT OUTREACH (OUTPATIENT)
Dept: CARE COORDINATION | Facility: CLINIC | Age: 59
End: 2024-03-14
Payer: COMMERCIAL

## 2024-03-14 DIAGNOSIS — R47.01 SEMANTIC APHASIA: ICD-10-CM

## 2024-03-14 DIAGNOSIS — F02.80 ALZHEIMER'S DISEASE (H): ICD-10-CM

## 2024-03-14 DIAGNOSIS — G31.09 FRONTOTEMPORAL DEMENTIA (H): ICD-10-CM

## 2024-03-14 DIAGNOSIS — G30.9 ALZHEIMER'S DISEASE (H): ICD-10-CM

## 2024-03-14 DIAGNOSIS — R47.89 WORD FINDING DIFFICULTY: Primary | ICD-10-CM

## 2024-03-14 DIAGNOSIS — F02.80 FRONTOTEMPORAL DEMENTIA (H): ICD-10-CM

## 2024-03-14 NOTE — PROGRESS NOTES
Clinic Care Coordination Contact  Care Team Conversations    Patient identified for care management outreach, however patient is not on a value based contract so cannot complete outreach. Will escalate to clinic staff if specific needs or resources are indicated.    ELIDA CC consulted and provided resources for the pt's spouse.     Jovanna Suarez, [facility]  Social Work Care Coordinator - [facility]  Care Coordination  [email or web address]  Cell Phone: [phone]  Gender pronouns: she/her  Employed by [employer]

## 2024-03-18 NOTE — PROGRESS NOTES
Clinic Care Coordination Contact  Clinic Care Coordination Contact  OUTREACH    Referral Information: PCP      Chief Complaint   Patient presents with    Clinic Care Coordination - Initial     Spoke with patient spouse Madai. Madai noted Robbin memory has been declining She concerns of wondering. Spouse shared he receiving $2500 per month in social security disability. Spouse noted she starting private home care for him however due to a dog biting the worker, home care agency has requested for them to make the dog is not there. Spouse is interested in additional list of home care agencies in area.     Patient spouse shared he enrolling in medicare in 13 months. Writer discussed medicare coverage and discussed how medicare doesn't pay for private home care.     Writer discussed [place] guideline and noted likely he wont qualify got [place] at this time however writer can [place]. Patient spouse wont hold of on referral at this time.     Spoke with [facility] care for home care referral. Received a call back from Sarai who shared they can accept patient however they wont be able to start services till next week.       Universal Utilization: appropriate      Utilization      No Show Count (past year)  1             ED Visits  0             Hospital Admissions  0                    Current as of: 3/18/2024  5:56 AM                Clinical Concerns:  Current Medical Concerns:    Current Outpatient Medications   Medication    donepezil (ARICEPT) 10 MG tablet    donepezil (ARICEPT) 5 MG tablet    MULTIPLE VITAMIN PO     No current facility-administered medications for this visit.         Current Behavioral Concerns: Unable to assess    Education Provided to patient: Discussed county benefits and private home care      Health Maintenance Reviewed:    Clinical Pathway: None    Medication Management:  Medication review status: Medications reviewed and no changes reported per patient.              Functional Status:  Dependent ADLs:: Independent  Dependent IADLs:: Independent  Mobility Status: Independent  Fallen 2 or more times in the past year?: No  Any fall with injury in the past year?: No    Living Situation:  Current living arrangement:: I live alone    Lifestyle & Psychosocial Needs:    Social Determinants of Health     Food Insecurity: Not on file   Depression: Not at risk (3/13/2024)    PHQ-2     PHQ-2 Score: 0   Housing Stability: Not on file   Tobacco Use: Low Risk  (7/27/2023)    Patient History     Smoking Tobacco Use: Never     Smokeless Tobacco Use: Never     Passive Exposure: Not on file   Financial Resource Strain: Not on file   Alcohol Use: Not on file   Transportation Needs: Not on file   Physical Activity: Not on file   Interpersonal Safety: Not on file   Stress: Not on file   Social Connections: Not on file              Denominational or spiritual beliefs that impact treatment:: No  Mental health DX:: No  Mental health management concern (GOAL):: No  Chemical Dependency Status: No Current Concerns  Informal Support system:: None      Resources and Interventions:  Current Resources:      Supplies Currently Used at Home: None  Equipment Currently Used at Home: none  Employment Status: disabled      Advance Care Plan/Directive  Advanced Care Plans/Directives on file:: No    Referrals Placed: None       Care Plan:  Care Plan: Help At Home       Problem: Insufficient In-home support       Goal: Establish adequate home support       Start Date: 3/13/2024    This Visit's Progress: 30%    Priority: Medium    Note:     Barriers: income, insurance   Strengths: Supportive spouse  Patient expressed understanding of goal: Yes  Action steps to achieve this goal:  1. I will review private home care list and start services in next month.   2. I will engage [facility] for in home evaluation in next week.   3. I will engage with CC monthly and request additional support as needed.                                  Patient/Caregiver understanding: Pt reports understanding and denies any additional questions or concerns at this times. SW CC engaged in AIDET communication during encounter.         Future Appointments                In 2 days Irma Fabian MD [facility]    In 1 week Arabella Hernandez SLP [facility]            Plan: patient spouse will help with reviewing home care agencies and engage with skilled home care provider [facility] in next week.     Harvey Perdue [facility]  Social Work Care Cooridinator   [facility]   Phone: [phone]

## 2024-03-22 ENCOUNTER — TELEPHONE (OUTPATIENT)
Dept: NEUROLOGY | Facility: CLINIC | Age: 59
End: 2024-03-22

## 2024-03-22 ENCOUNTER — HOSPITAL ENCOUNTER (EMERGENCY)
Facility: HOSPITAL | Age: 59
Discharge: HOME | End: 2024-03-22
Attending: EMERGENCY MEDICINE | Admitting: EMERGENCY MEDICINE
Payer: COMMERCIAL

## 2024-03-22 VITALS
HEART RATE: 64 BPM | RESPIRATION RATE: 18 BRPM | SYSTOLIC BLOOD PRESSURE: 126 MMHG | DIASTOLIC BLOOD PRESSURE: 83 MMHG | HEIGHT: 76 IN | TEMPERATURE: 97 F | BODY MASS INDEX: 30.44 KG/M2 | OXYGEN SATURATION: 96 % | WEIGHT: 250 LBS

## 2024-03-22 DIAGNOSIS — E86.0 DEHYDRATION: ICD-10-CM

## 2024-03-22 DIAGNOSIS — G31.09 MAJOR NEUROCOGNITIVE DISORDER, DUE TO FRONTOTEMPORAL LOBAR DEGENERATION, WITHOUT BEHAVIORAL DISTURBANCE, MILD (H): Primary | ICD-10-CM

## 2024-03-22 DIAGNOSIS — F02.A0 MAJOR NEUROCOGNITIVE DISORDER, DUE TO FRONTOTEMPORAL LOBAR DEGENERATION, WITHOUT BEHAVIORAL DISTURBANCE, MILD (H): Primary | ICD-10-CM

## 2024-03-22 LAB
ALBUMIN SERPL BCG-MCNC: 3.8 G/DL (ref 3.5–5.2)
ALP SERPL-CCNC: 67 U/L (ref 40–150)
ALT SERPL W P-5'-P-CCNC: 53 U/L (ref 0–70)
ANION GAP SERPL CALCULATED.3IONS-SCNC: 12 MMOL/L (ref 7–15)
AST SERPL W P-5'-P-CCNC: 53 U/L (ref 0–45)
BASOPHILS # BLD AUTO: 0 10E3/UL (ref 0–0.2)
BASOPHILS NFR BLD AUTO: 1 %
BILIRUB SERPL-MCNC: 0.7 MG/DL
BUN SERPL-MCNC: 15 MG/DL (ref 6–20)
CALCIUM SERPL-MCNC: 9 MG/DL (ref 8.6–10)
CHLORIDE SERPL-SCNC: 106 MMOL/L (ref 98–107)
CREAT SERPL-MCNC: 0.97 MG/DL (ref 0.67–1.17)
DEPRECATED HCO3 PLAS-SCNC: 22 MMOL/L (ref 22–29)
EGFRCR SERPLBLD CKD-EPI 2021: >90 ML/MIN/1.73M2
EOSINOPHIL # BLD AUTO: 0 10E3/UL (ref 0–0.7)
EOSINOPHIL NFR BLD AUTO: 1 %
ERYTHROCYTE [DISTWIDTH] IN BLOOD BY AUTOMATED COUNT: 12 % (ref 10–15)
GLUCOSE SERPL-MCNC: 90 MG/DL (ref 70–99)
HCT VFR BLD AUTO: 39.9 % (ref 40–53)
HGB BLD-MCNC: 13.4 G/DL (ref 13.3–17.7)
IMM GRANULOCYTES # BLD: 0 10E3/UL
IMM GRANULOCYTES NFR BLD: 0 %
LYMPHOCYTES # BLD AUTO: 1.8 10E3/UL (ref 0.8–5.3)
LYMPHOCYTES NFR BLD AUTO: 30 %
MCH RBC QN AUTO: 28.6 PG (ref 26.5–33)
MCHC RBC AUTO-ENTMCNC: 33.6 G/DL (ref 31.5–36.5)
MCV RBC AUTO: 85 FL (ref 78–100)
MONOCYTES # BLD AUTO: 0.4 10E3/UL (ref 0–1.3)
MONOCYTES NFR BLD AUTO: 7 %
NEUTROPHILS # BLD AUTO: 3.8 10E3/UL (ref 1.6–8.3)
NEUTROPHILS NFR BLD AUTO: 61 %
NRBC # BLD AUTO: 0 10E3/UL
NRBC BLD AUTO-RTO: 0 /100
PLATELET # BLD AUTO: 209 10E3/UL (ref 150–450)
POTASSIUM SERPL-SCNC: 3.4 MMOL/L (ref 3.4–5.3)
PROT SERPL-MCNC: 6.2 G/DL (ref 6.4–8.3)
RBC # BLD AUTO: 4.68 10E6/UL (ref 4.4–5.9)
SODIUM SERPL-SCNC: 140 MMOL/L (ref 135–145)
WBC # BLD AUTO: 6.1 10E3/UL (ref 4–11)

## 2024-03-22 PROCEDURE — 85025 COMPLETE CBC W/AUTO DIFF WBC: CPT | Performed by: EMERGENCY MEDICINE

## 2024-03-22 PROCEDURE — 96360 HYDRATION IV INFUSION INIT: CPT

## 2024-03-22 PROCEDURE — 36415 COLL VENOUS BLD VENIPUNCTURE: CPT | Performed by: EMERGENCY MEDICINE

## 2024-03-22 PROCEDURE — 258N000003 HC RX IP 258 OP 636: Performed by: EMERGENCY MEDICINE

## 2024-03-22 PROCEDURE — 99283 EMERGENCY DEPT VISIT LOW MDM: CPT | Mod: 25

## 2024-03-22 PROCEDURE — 80053 COMPREHEN METABOLIC PANEL: CPT | Performed by: EMERGENCY MEDICINE

## 2024-03-22 PROCEDURE — 250N000013 HC RX MED GY IP 250 OP 250 PS 637: Performed by: EMERGENCY MEDICINE

## 2024-03-22 RX ORDER — METOPROLOL TARTRATE 25 MG/1
25 TABLET, FILM COATED ORAL ONCE
Status: COMPLETED | OUTPATIENT
Start: 2024-03-22 | End: 2024-03-22

## 2024-03-22 RX ADMIN — SODIUM CHLORIDE 1000 ML: 9 INJECTION, SOLUTION INTRAVENOUS at 15:38

## 2024-03-22 RX ADMIN — METOPROLOL TARTRATE 25 MG: 25 TABLET, FILM COATED ORAL at 15:22

## 2024-03-22 ASSESSMENT — ACTIVITIES OF DAILY LIVING (ADL)
ADLS_ACUITY_SCORE: 35

## 2024-03-22 NOTE — ED NOTES
Bed: JNED-25  Expected date:   Expected time:   Means of arrival:   Comments:  Allyvonne- 59yo M Panic attack, hx Dementia

## 2024-03-22 NOTE — TELEPHONE ENCOUNTER
[facility]    Phone Message    May a detailed message be left on voicemail: yes     Reason for Call:  Patients spouse called states that patient is highly agitated and anxious. Spouse is concern that he is not safe at home, wondering if  can prescribe him some anxiety medications? Please call back to speak to Madai.[phone]     Action Taken: [facility] neurology      Travel Screening: Not Applicable

## 2024-03-22 NOTE — ED PROVIDER NOTES
"EMERGENCY DEPARTMENT ENCOUNTER            IMPRESSION:  Anxiety  History early dementia      MEDICAL DECISION MAKING:  It was my pleasure to provide care for Robbin Forrester who presented by ambulance for anxious behavior.  He has a history of early dementia.  He has been followed by outpatient neurology.  Today his wife called the medics for worrisome behavior.  His wife also reports some recent diarrhea and possibly dehydration    On my exam patient is pleasant and cooperative.   Vital signs are normal.  Physical exam notable for neurologic evidence of some impairment.     IV fluid administered for symptom relief.  Patient's symptoms improved.     Laboratory investigation independently interpreted and notable for normal CBC and chemistry    He was observed for several hours.  Patient remained calm and cooperative.  Wife feels comfortable with returning home.  Recommended outpatient follow-up with neurology for possibly as needed medications    Prior to making a final disposition on this patient the results of patient's tests and other diagnostic studies were discussed with the patient. All questions were answered. Patient expressed understanding of the plan and was amenable.    Return precautions and follow-up were discussed.     =================================================================  CHIEF COMPLAINT:  Chief Complaint   Patient presents with    Panic Attack         HPI  Robbin Forrester is a 58 year old male with a history of Alzheimer's who presents to the ED by EMS for evaluation of a panic attack.    Per patient's wife, patient was having a panic attack today with pacing, repeating \"oh no\", and clutching things. He's had panic attacks before but not this severe. His wife called EMS because she was concerned he would elope. He has had diarrhea for a few days. Patient has dementia and lives at home with his wife.   Per EMS, patient was hyperventilating during transport.    REVIEW OF SYSTEMS  Constitutional: Does " "not report chills, unintentional weight loss or fatigue   Eyes: Does not report visual changes or discharge    HENT: Does not report sore throat, ear pain or neck pain  Respiratory: Does not report cough or shortness of breath    Cardiovascular: Does not report chest pain, palpitations or leg swelling  GI: Does not report abdominal pain, nausea, vomiting, or dark, bloody stools. Diarrhea.  : Does not report hematuria, dysuria, or flank pain  Musculoskeletal: Does not report any new musculoskeletal pain or new muscle/joint pains  Skin: Does not report rash or wound  Neurologic: Does not report current headache, new weakness, focal weakness, or sensory changes        Remainder of systems reviewed, unless noted in HPI all others negative.      PAST MEDICAL HISTORY:  No past medical history on file.    PAST SURGICAL HISTORY:  No past surgical history on file.      CURRENT MEDICATIONS:    donepezil (ARICEPT) 10 MG tablet  donepezil (ARICEPT) 5 MG tablet  MULTIPLE VITAMIN PO        ALLERGIES:  Allergies   Allergen Reactions    Cat Hair Extract Unknown       FAMILY HISTORY:  Family History   Problem Relation Age of Onset    Cancer Mother        SOCIAL HISTORY:   Social History     Socioeconomic History    Marital status:    Tobacco Use    Smoking status: Never    Smokeless tobacco: Never   Substance and Sexual Activity    Drug use: Not Currently       PHYSICAL EXAM:    /83   Pulse 64   Temp 97  F (36.1  C) (Oral)   Resp 18   Ht 1.93 m (6' 4\")   Wt 113.4 kg (250 lb)   SpO2 96%   BMI 30.43 kg/m      Constitutional: He is awake and responsive.  There is some obvious memory and comprehension impairment  Respiratory: Respirations even,   Cardiovascular: Regular rate and rhythm.  Back: No CVA tenderness.    Musculoskeletal: Moves all 4 extremities equally, full function and capacity no peripheral edema.   Integument: Warm, dry. No rash. No bruising or petechiae.  Neurologic: Awake and responsive.  Alert to " person.  He is not oriented to place or time  Psychiatric: Normal mood and affect.  Appropriate judgement.    ED COURSE:  2:25 PM I met with the patient, obtained history, performed an initial exam, and discussed options and plan for diagnostics and treatment here in the ED.   3:46 PM Rechecked and updated patient.       Medical Decision Making    History:  Supplemental history from: Family and paramedics  External Record(s) reviewed: External medical records including care everywhere reviewed 3/13/24 Virtual visit with [facility].    Work Up:  Laboratory studies as ordered were independently interpreted by myself.   Broad differential diagnosis considered for delirium  The patient's presentation was of high complexity.     Complicating factors:  Patient has a complicated past medical history including Alzheimer's.  Care affected by social determinants of health: Access to primary care    Disposition involved shared decision-making with the patient.    Admission was considered for delirium however after discussion with the patient and evidence of clinical improvement patient was felt safe for discharge home.     Patient otherwise to continue outpatient medications as prescribed.       LAB:  Laboratory results were independently reviewed and interpreted  Results for orders placed or performed during the hospital encounter of 03/22/24   Comprehensive metabolic panel   Result Value Ref Range    Sodium 140 135 - 145 mmol/L    Potassium 3.4 3.4 - 5.3 mmol/L    Carbon Dioxide (CO2) 22 22 - 29 mmol/L    Anion Gap 12 7 - 15 mmol/L    Urea Nitrogen 15.0 6.0 - 20.0 mg/dL    Creatinine 0.97 0.67 - 1.17 mg/dL    GFR Estimate >90 >60 mL/min/1.73m2    Calcium 9.0 8.6 - 10.0 mg/dL    Chloride 106 98 - 107 mmol/L    Glucose 90 70 - 99 mg/dL    Alkaline Phosphatase 67 40 - 150 U/L    AST 53 (H) 0 - 45 U/L    ALT 53 0 - 70 U/L    Protein Total 6.2 (L) 6.4 - 8.3 g/dL    Albumin 3.8 3.5 - 5.2 g/dL    Bilirubin Total  0.7 <=1.2 mg/dL   CBC with platelets and differential   Result Value Ref Range    WBC Count 6.1 4.0 - 11.0 10e3/uL    RBC Count 4.68 4.40 - 5.90 10e6/uL    Hemoglobin 13.4 13.3 - 17.7 g/dL    Hematocrit 39.9 (L) 40.0 - 53.0 %    MCV 85 78 - 100 fL    MCH 28.6 26.5 - 33.0 pg    MCHC 33.6 31.5 - 36.5 g/dL    RDW 12.0 10.0 - 15.0 %    Platelet Count 209 150 - 450 10e3/uL    % Neutrophils 61 %    % Lymphocytes 30 %    % Monocytes 7 %    % Eosinophils 1 %    % Basophils 1 %    % Immature Granulocytes 0 %    NRBCs per 100 WBC 0 <1 /100    Absolute Neutrophils 3.8 1.6 - 8.3 10e3/uL    Absolute Lymphocytes 1.8 0.8 - 5.3 10e3/uL    Absolute Monocytes 0.4 0.0 - 1.3 10e3/uL    Absolute Eosinophils 0.0 0.0 - 0.7 10e3/uL    Absolute Basophils 0.0 0.0 - 0.2 10e3/uL    Absolute Immature Granulocytes 0.0 <=0.4 10e3/uL    Absolute NRBCs 0.0 10e3/uL           MEDICATIONS GIVEN IN THE EMERGENCY:  Medications   metoprolol tartrate (LOPRESSOR) tablet 25 mg (25 mg Oral $Given 3/22/24 1522)   sodium chloride 0.9% BOLUS 1,000 mL (0 mLs Intravenous Stopped 3/22/24 1644)           NEW PRESCRIPTIONS STARTED AT TODAY'S ER VISIT:  New Prescriptions    No medications on file                FINAL DIAGNOSIS:    ICD-10-CM    1. Dehydration  E86.0                  NAME: Robbin Forrester  AGE: 58 year old male  YOB: 1965  MRN: [id number]  EVALUATION DATE & TIME: 3/22/2024  1:23 PM    PCP: Anaid Mishra    ED PROVIDER: Dread Norwood M.D.      Alexia SANDOVAL, am serving as a scribe to document services personally performed by Dr. Dread Norwood based on my observation and the provider's statements to me. I, Dread Norwood MD attest that Alexia Powell is acting in a scribe capacity, has observed my performance of the services and has documented them in accordance with my direction.    Dread Norwood M.D.  Emergency Medicine  [facility] EMERGENCY DEPARTMENT  [facility]  [address]  [phone]  Dept: [phone]  3/22/2024         Dread Norwood MD  03/22/24 9232

## 2024-03-22 NOTE — ED NOTES
Patient up to the bathroom x2, able to ambulate on own with direction from wife. Patient calm and cooperative, appears to be less anxious. NS completed, IV removed, AVS provided to wife and patient with education regarding follow up appointment.

## 2024-03-22 NOTE — ED NOTES
"Patient unable to verbalize answers to questions asked by Dr. Norwood. Wife endorses the patient's \"panic attack\" episode today, stating the episodes have been increasing in severity and intermittent frequency without violence towards others. Patient calm and cooperative, but appears to be worried.  "

## 2024-03-22 NOTE — ED TRIAGE NOTES
Patient arrives via EMS [id number]. Wife reports the patient had a panic attack and started screaming and pacing before EMS arrival. Hx of [facility]. EMS reports patient hyperventilating during transport, and breathing WNL upon arrival, VSS, 12 lead NSR.

## 2024-03-25 RX ORDER — QUETIAPINE FUMARATE 25 MG/1
12.5-25 TABLET, FILM COATED ORAL 2 TIMES DAILY PRN
Qty: 60 TABLET | Refills: 3 | Status: SHIPPED | OUTPATIENT
Start: 2024-03-25 | End: 2024-08-30

## 2024-03-25 RX ORDER — CITALOPRAM HYDROBROMIDE 10 MG/1
10 TABLET ORAL DAILY
Qty: 30 TABLET | Refills: 5 | Status: SHIPPED | OUTPATIENT
Start: 2024-03-25

## 2024-03-25 NOTE — TELEPHONE ENCOUNTER
Called Madai back (CTC on file)    Relayed provider message (see below).     Reviewed common side effects of citalopram and seroquel.  Advised to call back with any questions or concerns prior to appt 4/10/23.    Letitia HUGHES RN, BSN  [facility]

## 2024-03-25 NOTE — TELEPHONE ENCOUNTER
Please let Robbin (and Madai) know that I apologize for the appointment cancellation.      We could start some citalopram (10mg daily) and then seroquel twice daily as needed (12.5mg-25mg) for agitation/anxiety.  Sent to [facility] pharmacy.    Thank you,  Dr. Jackson

## 2024-03-25 NOTE — TELEPHONE ENCOUNTER
Called and spoke with patient's spouse (ctc on file), who is concerned about patient's overall mood worsening recently, thinking he should be on something to help with daily maintenance of mood, and thinks waiting until April appointment is a long time to wait for interventions (patient was scheduled last Wednesday, but rescheduled do to provider's absence).    Patient also seen in ER on 3/22 for agitation. Reported by spouse that patient is having increased episodes of agoraphobia.    Ultimately, request is for something to help with anxiety, and also overall mood.    MD to please advise.    Perico Rodriguez, RN, BSN  [facility]

## 2024-04-09 ENCOUNTER — PATIENT OUTREACH (OUTPATIENT)
Dept: CARE COORDINATION | Facility: CLINIC | Age: 59
End: 2024-04-09
Payer: COMMERCIAL

## 2024-04-09 DIAGNOSIS — Z71.89 OTHER SPECIFIED COUNSELING: Primary | Chronic | ICD-10-CM

## 2024-04-09 NOTE — PROGRESS NOTES
Clinic Care Coordination Contact  [facility]/Voicemail    Clinical Data: Care Coordinator Outreach        Left message on patient's voicemail with call back information and requested return call.    Plan: Care Coordinator will try to reach patient again in 10 business days.    Harvey Perdue [facility]  Social Work Care [facility]   Phone: [phone]

## 2024-04-22 NOTE — PROGRESS NOTES
Clinic Care Coordination Contact  Follow Up Progress Note      Assessment: Pc to patient spouse for follow.Spouse noted he patient is doing okay. She started private home care at [facility] three times per week. Patient spouse she able to increaste that if needed. Patient spouse inquire about medical assistance. Writer offered FRW referral for assist with medical assistance application process.     Care Gaps:    Health Maintenance Due   Topic Date Due    COLORECTAL CANCER SCREENING  Never done    HIV SCREENING  Never done    HEPATITIS C SCREENING  Never done    HEPATITIS B IMMUNIZATION (1 of 3 - 19+ 3-dose series) Never done    YEARLY PREVENTIVE VISIT  10/12/2019    COVID-19 Vaccine (3 - 2023-24 season) 09/01/2023       Currently there are no Care Gaps.    Care Plans  Care Plan: Help At Home       Problem: Insufficient In-home support       Goal: Establish adequate home support       Start Date: 3/13/2024    This Visit's Progress: 50% Recent Progress: 30%    Priority: Medium    Note:     Barriers: income, insurance   Strengths: Supportive spouse  Patient expressed understanding of goal: Yes  Action steps to achieve this goal:  1. I will review private home care list and start services in next month.  Hire private home care for [facility]  2. I will engage [facility] for in home evaluation in next week.   3. I will engage with CC monthly and request additional support as needed.                               Care Plan: Financial Wellbeing       Problem: Patient expresses financial resource strain       Goal: Create an action plan to increase financial stability       Start Date: 4/22/2024    This Visit's Progress: 20%    Note:     Barriers: Income, insurance knowledge   Strengths: Supportive spouse  Patient expressed understanding of goal: Yes   Action steps to achieve this goal:  1. I will engage with FRW for Medical assistance application process in next month  2. I will engage with CC monthly  and request additional support as needed.                                 Intervention/Education provided during outreach: Pt reports understanding and denies any additional questions or concerns at this times. SW CC engaged in AIDET communication during encounter.        Plan:  Patient spouse with engage with FRW and continue to engage with private home care.     Care Coordinator will follow up in monthly     ARUNA Blake  Social Work Care C[place]javy   [facility]   Phone: [phone]

## 2024-04-24 ENCOUNTER — PATIENT OUTREACH (OUTPATIENT)
Dept: CARE COORDINATION | Facility: CLINIC | Age: 59
End: 2024-04-24
Payer: COMMERCIAL

## 2024-05-01 ENCOUNTER — PATIENT OUTREACH (OUTPATIENT)
Dept: CARE COORDINATION | Facility: CLINIC | Age: 59
End: 2024-05-01
Payer: COMMERCIAL

## 2024-05-14 DIAGNOSIS — G31.09 FRONTOTEMPORAL DEMENTIA (H): ICD-10-CM

## 2024-05-14 DIAGNOSIS — F02.80 FRONTOTEMPORAL DEMENTIA (H): ICD-10-CM

## 2024-05-14 NOTE — TELEPHONE ENCOUNTER
Refill request for: Donepazil     Directions: Take 1 tablet at bedtime      LOV: 7/27/23  NOV: Not scheduled- [employer] message sent     30 day supply with 0 refills Medication T'd for review and signature   Chapis Whitney CMA on 5/14/2024 at 4:18 PM  [facility]

## 2024-05-15 RX ORDER — DONEPEZIL HYDROCHLORIDE 10 MG/1
10 TABLET, FILM COATED ORAL AT BEDTIME
Qty: 30 TABLET | Refills: 0 | Status: SHIPPED | OUTPATIENT
Start: 2024-05-15 | End: 2024-06-10

## 2024-05-22 ENCOUNTER — PATIENT OUTREACH (OUTPATIENT)
Dept: CARE COORDINATION | Facility: CLINIC | Age: 59
End: 2024-05-22
Payer: COMMERCIAL

## 2024-05-22 NOTE — LETTER
[facility] CARE COORDINATION  [address] [address]    June 13, 2024    Robbin Forrester  [address] [address]      Dear Robbin,    I have been unsuccessful in reaching you since our last contact. At this time the Care Coordination team will make no further attempts to reach you, however this does not change your ability to continue receiving care from your providers at your primary care clinic. If you need additional support from a care coordinator in the future please contact me at [phone].    All of us at [facility] are invested in your health and are here to assist you in meeting your goals.     Sincerely,    Harvey Perdue, [facility]  Social Work Care Cooridinator   [facility]   Phone: [phone]

## 2024-05-22 NOTE — PROGRESS NOTES
Clinic Care Coordination Contact  [facility]/Voicemail    Clinical Data: Care Coordinator Outreach        Left message on patient's voicemail with call back information and requested return call.    Plan: Care Coordinator will try to reach patient again in 10 business days.    Harvey Perdue [facility]  Social Work [facility]   Phone: [phone]

## 2024-06-04 NOTE — PROGRESS NOTES
Clinic Care Coordination Contact  [facility]/Voicemail    Clinical Data: Care Coordinator Outreach        Left message on patient's voicemail with call back information and requested return call.    Plan: Care Coordinator will send unable to contact letter with care coordinator contact information via [employer]. Care Coordinator will try to reach patient again in 10 business days.    Harvey Perdue [facility]  Social Work Care [facility]   Phone: [phone]

## 2024-06-10 DIAGNOSIS — F02.80 FRONTOTEMPORAL DEMENTIA (H): ICD-10-CM

## 2024-06-10 DIAGNOSIS — G31.09 FRONTOTEMPORAL DEMENTIA (H): ICD-10-CM

## 2024-06-10 RX ORDER — DONEPEZIL HYDROCHLORIDE 10 MG/1
10 TABLET, FILM COATED ORAL AT BEDTIME
Qty: 14 TABLET | Refills: 0 | Status: SHIPPED | OUTPATIENT
Start: 2024-06-10 | End: 2024-08-01

## 2024-06-10 NOTE — TELEPHONE ENCOUNTER
Refill request for: Donepezil    Directions: Take 1 tablet at bedtime     LOV: 7/27/23  NOV: None - Mychart already sent.     14 day supply with 0 refills Medication T'd for review and signature

## 2024-06-13 NOTE — PROGRESS NOTES
Clinic Care Coordination Contact  [facility]/Voicemail    Clinical Data: Care Coordinator Outreach        Left message on patient's voicemail with call back information and requested return call.    Plan: Care Coordinator will send disenrollment letter with care coordinator contact information via "[employer]. Care Coordinator will do no further outreaches at this time.    Harvey Perdue [facility]  Social Work Care [facility]   Phone: [phone]

## 2024-08-01 DIAGNOSIS — G31.09 FRONTOTEMPORAL DEMENTIA (H): ICD-10-CM

## 2024-08-01 DIAGNOSIS — F02.80 FRONTOTEMPORAL DEMENTIA (H): ICD-10-CM

## 2024-08-01 RX ORDER — DONEPEZIL HYDROCHLORIDE 10 MG/1
10 TABLET, FILM COATED ORAL AT BEDTIME
Qty: 90 TABLET | Refills: 0 | Status: SHIPPED | OUTPATIENT
Start: 2024-08-01 | End: 2024-10-30

## 2024-08-01 NOTE — TELEPHONE ENCOUNTER
Refill request for: donepezil (ARICEPT) 10 MG tablet    Directions: Take 1 tablet (10 mg) by mouth at bedtime     LOV: 9/5/23  NOV: 11/1/24    90 day supply with 0 refills Medication T'd for review and signature  Chapis Whitney CMA on 8/1/2024 at 2:31 PM  [facility]

## 2024-08-30 DIAGNOSIS — G31.09 MAJOR NEUROCOGNITIVE DISORDER, DUE TO FRONTOTEMPORAL LOBAR DEGENERATION, WITHOUT BEHAVIORAL DISTURBANCE, MILD (H): ICD-10-CM

## 2024-08-30 DIAGNOSIS — F02.A0 MAJOR NEUROCOGNITIVE DISORDER, DUE TO FRONTOTEMPORAL LOBAR DEGENERATION, WITHOUT BEHAVIORAL DISTURBANCE, MILD (H): ICD-10-CM

## 2024-08-30 NOTE — TELEPHONE ENCOUNTER
Refill request for: QUEtiapine (SEROQUEL) 25 MG tablet    Directions: Take 0.5-1 tablets (12.5-25 mg) by mouth 2 times daily as needed (agitation, anxiety)     LOV: 7/27/23  NOV: 11/1/24    30 day supply with 2 refills Medication T'd for review and signature  Chapis Whitney CMA on 8/30/2024 at 9:22 AM  [facility]

## 2024-09-05 RX ORDER — QUETIAPINE FUMARATE 25 MG/1
12.5-25 TABLET, FILM COATED ORAL 2 TIMES DAILY PRN
Qty: 60 TABLET | Refills: 2 | Status: SHIPPED | OUTPATIENT
Start: 2024-09-05

## 2024-10-31 NOTE — PROGRESS NOTES
ESTABLISHED PATIENT NEUROLOGY NOTE    DATE OF VISIT: 7/27/2023  MRN: [id number]  PATIENT NAME: Robbin Forrester  YOB: 1965    Chief Complaint   Patient presents with    Dementia     Wife states that patient has been wandering from the home to a family members house about 2 miles away. He may have slashed a tire with a knife . Follow up     SUBJECTIVE:                                                        HISTORY OF PRESENT ILLNESS:  Robbin is here for follow up regarding Dementia    Robbin Forrester is a 58 year old male who is followed in the clinic for memory and speech issues.  He follows Dr. Jackson in the clinic, last seen 4/28/2023 to review neuropsych test results.  Per chart review of his last visit, Robbin discusses that at work, he has 6 different duties at work. When he gets to the end of the day, he tends to fall asleep with his dogs. When he is woken from sleep, he has trouble with slowness and difficulty with annunciation. His wife confirms that his speech is much worse when he first wakes. She has noticed word-finding difficulties in general for about 2 years, which correlates with the time he started working overnights. No problems with memory from their standpoint.  He has found it helpful to try and keep his dogs on a regular schedule, so that he is woken less frequently.  On his weekends, or if his wife is home, he is able to get much more restful sleep. His week begins on Sunday. He says he can be up 24-26 hours in a row.  He has not really noticed problems with remembering people's names.  No mention of problems with writing. He denies problems with driving, though his wife has noticed that he crosses over the [place] line sometimes, and roundabouts are very difficult for him to maneuver.  He does have an eye condition that affects his ability to see well during the day, so she wonders if it is related to this.  He feels that he sees better driving at night.  He has not had problems with getting  lost driving. Robbin denies weakness or sensory changes.  He is not typically on headaches.  No problems with swallow.  No problems controlling his bladder or bowels.  Wife has not noticed a change in his gait.  He has not had any work-up for the language issues as of yet.  No recent brain imaging.     They are in the process of working on getting his disability forms filled out.  His last day of work was 12 April.  He has stopped driving as recommended.  They did already contact his primary physician about referrals for speech therapy and occupational therapy.  His wife has been trying to keep him busy from a physical standpoint, son also helps.  Robbin tells me he feels better after working out.  He says sleep has been really good lately now that his schedule affords this.  He can get up to 8 hours of sleep which is much better than the 4 hours he was getting previously.  Wife asks about how to get a referral to social work through the [facility] system per there preference.  He would like to keep Dr. Hanna as his primary physician     07/27/23 Updates From This Visit:Today Robbin Forrester is here with his wife, Shira, who is an independent historian and provides collateral information included below.  She reports that she had initial concerns over Robbin's speech.  A few years ago he was able to have in-depth clear conversations.  In the last few years his speech has become halting where he is unable to find the words he is trying to express.  He also had an increase in  his short-term memory loss.  She noticed that he was not paying bills and constantly losing things like his keys and phone.  Robbin states that he was in hockey and football growing up and has had 2 previous concussions.  He feels that his mood has been good/stable.  His sleep has greatly improved since he stopped working overnights.  He is currently getting 8 to 10 hours per night.  He denies any falls and is very steady on his feet.  Robbin  states that he takes each one of his 3 dogs on a 1.5 mile walk each day.  He also attends the gym 2-3 times a week to work on strength training.  He is dependent in his ADLs.  He reports that he has never gotten lost.  His wife agrees with all of his reports.  At his last appointment he was started on Aricept 10 mg daily.  Since this time they have not noticed any real improvements or declines.    Robbin reports having 1 or 2 episodes of left hand tremor lasting about 20 minutes before resolving. Spoke to his primary neurologist. We will continue to monitor. If symptoms continue we will do further work-up at that time.     Shira states that Robbin signed up for a research study through the [facility].  They plan on attending September 5 through 7.    - No ETOH, nicotine or recreational drug use.     11/01/24: Robbin presents to the clinic today for his annual memory follow-up.  He is accompanied by his wife Shira. Robbin tells me that he is mostly calm but has some days he is not very calm.  Shira agrees that for the most part he is doing good.  They had one occasion where the  were called after he left the house without forewarning her.  She also reports that yesterday he slashed her tires to their truck because he wanted her to drive their other vehicle.  Robbin is still performing ADLs independently.  He states that his sleep is decent. Shira has a PCA that comes to the house 3 days a week for 8 hours at a time, when she works.  They have a daughter that lives in a group home and visits on the weekends.  She is planning to hire a PCA to help care for Robbin over the weekend so she can spend time with her daughter.  Shira tells me that when he takes Aricept and Seroquel he tolerates it well.  He is typically unwilling to take his meds.  She states he may take them up to twice per week.  They have no other questions or concerns at this time..    CURRENT MEDICATIONS:  Current Outpatient Medications   Medication Sig Dispense  Refill    citalopram (CELEXA) 10 MG tablet Take 1 tablet (10 mg) by mouth daily 30 tablet 5    donepezil (ARICEPT) 10 MG tablet Take 1 tablet (10 mg) by mouth at bedtime. 90 tablet 3    donepezil (ARICEPT) 5 MG tablet Take 5 mg by mouth at bedtime.      MULTIPLE VITAMIN PO Take by mouth daily      QUEtiapine (SEROQUEL) 25 MG tablet Take 1 tablet (25 mg) by mouth 2 times daily as needed (agitation, anxiety). 60 tablet 5     No current facility-administered medications for this visit.     PAST MEDICAL HISTORY:  Patient  has no past medical history on file.    SURGICAL HISTORY:  He  has no past surgical history on file.    FAMILY AND SOCIAL HISTORY:  Reviewed, and he  reports that he has never smoked. He has never used smokeless tobacco. He reports that he does not currently use drugs.    Reviewed, and family history includes Cancer in his mother.    RECENT DIAGNOSTIC STUDIES:     Imaging:  EXAM: MR BRAIN W/O and W CONTRAST  DATE/TIME: 12/7/2021 11:22 AM  IMPRESSION:  INDICATION: Speech disturbance, unspecified type. Word finding difficulty.   1.  No acute intracranial process.  2.  Generalized brain atrophy and presumed microvascular ischemic changes as detailed above.    Neuropsychological testing  Per Dr. Bustamante's 4.14.23 Assessment:  Summary for Providers  ASSESSMENT:  Impairments (mostly moderate to severe) identified across all domains assessed including attention, speed of thinking, language, learning, memory, and executive functioning  Ironically, his strongest and only intact score was on a language task, a measure of single word comprehension   Of note, memory deficits are retrieval based (I.e., he demonstrates some, albeit limited, benefit from cues)  Test scores together with his behavioral presentation, and history of symptoms reported by family (including progressive language decline with more recent memory difficulties) suggest that his presentation very likely represents a Primary Progressive Aphasia  (likely semantic variant given impairments in confrontation naming, regularization errors in reading and writing and notable word finding difficulties in conversational speech but intact grammar and fluency)  It is notable, however, that his language deficits are pervasive and impacting even over-learned skills (sight word reading, speeding reading of simple words) as well as skills that would normally be persevered in Semantic variant PPA (repetition, comprehension). Unfortunately I suspect that this relates to progression of the condition that has been evident for upwards of 3-4 years now (tanner presented to Neurology in the fall of 2021 with difficulties evident 1-2 years prior).   The global nature of cognitive deficits outside of language (I.e., memory, attention, cognitive efficiency and executive functioning) are also likely explained by progression of PPA, which although initially impacts language primarily, progresses over time to impact all cognitive skills  Of note, concern was raised about third shift work and whether insufficient sleep and disrupted sleep patterns could account for his presentation.  While it is very likely that sleep disruption is exacerbating or exaggerating his difficulties, it is highly unlikely that poor sleep alone can account for this presentation.  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 While this may have been the case to some extent, he was able to see adequately on a number of tasks (including a picture vocabulary task where he demonstrated average performance) and performed poorly on many tasks without a visual component. Reduced vision alone cannot account for his deficits on testing.  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 I'm very surprised that he has been able to continue work this  "long without mistakes and I suspect there may be errors that are not being detected  A referral to Social Work/ Care Management would be appropriate to assist with transition from work and general support with changes moving forward. As his PCP is in the [facility] network, he will need to pursue services there. If Dr. Gurinder Jackson is aware of another avenue into Care Management in the [facility] system, I would happily defer to her to facilitate that process.   Strongly recommend that he discontinue driving (given impairments in memory, speeded processing and executive functioning). He could consider a driving evaluation but as his cognition will continue to decline over time, it is unlikely he would be able to continue driving for very long.   It is recommended that his family continue to manage daily activities (I.e., cooking, finances, driving)  A trial of speech therapy may be beneficial to maximize language functioning.   Given the severity of his cognitive deficits, no future testing is recommended at this time unless questions of diagnosis or dispositional planning arise   While I think it highly unlikely that sleep alone could account for his presentation, if significant cognitive improvement is evident following a return to normal sleep patterns, re-evaluation is recommended (no need to wait for 1 year, just as soon as significant improvements have been present for at least a month) to determine if PPA continues to represent the most appropriate diagnosis     REVIEW OF SYSTEMS:                                                      10-point review of systems is negative except as mentioned above in HPI.    EXAM:                                                      Physical Exam:   Vitals: /74   Pulse 72   Ht 1.93 m (6' 4\")   Wt 122.5 kg (270 lb)   BMI 32.87 kg/m    BMI= Body mass index is 32.87 kg/m .  GENERAL: NAD.  HEENT: NC/AT.  PULM: Non-labored breathing.   Neurologic:  MENTAL STATUS: Alert, " attentive. Speech is aphasic. Normal comprehension. Normal concentration. Adequate fund of knowledge.   CRANIAL NERVES: Visual fields intact to confrontation. Pupils equally, round and reactive to light. Facial sensation and movement normal. EOM full. Hearing intact to conversation. Trapezius strength intact. Palate moves symmetrically. Tongue midline.  MOTOR: 5/5 in proximal and distal muscle groups of upper and lower extremities. Tone and bulk normal.   SENSATION: Normal light touch   COORDINATION: Normal finger nose finger. Finger tapping normal. Knee heel shin normal.  STATION AND GAIT: Romberg Negative. Good postural reflexes. Casual gait Normal  left hand-dominant.      ASSESSMENT and PLAN:                                                      Assessment:    ICD-10-CM    1. Frontotemporal dementia (H)  G31.09 donepezil (ARICEPT) 10 MG tablet    F02.80       2. Major neurocognitive disorder, due to frontotemporal lobar degeneration, without behavioral disturbance, mild (H)  G31.09 QUEtiapine (SEROQUEL) 25 MG tablet    F02.A0           Robbin Forrester is a 59 year old male who is followed in the clinic for memory and speech issues.  He follows Dr. Jackson in the clinic.  Robbin takes his medication willingly about twice per week.  Tolerating medication when he takes it.  No significant changes.  We discussed interventions outlined below we will plan to see patient back in 12 months..  Robbin and his wife understand and agree with this plan.      Plan:  --- Continue Aricept as prescribed:10 mg at bedtime.  --- Continue Seroquel as needed for agitation.   --- Plan on follow up in the Neurology Clinic in 12 months with Dr. Jackson.  --- Please feel free to reach out if you have any further questions or concerns.  --- Seek immediate medical attention if an emergency arises or if your health becomes progressively worse.     It was a pleasure meeting you today!       Total Time: 30 minutes were spent with the patient and in  chart review/documentation (as described above in Assessment and Plan)/coordinating the care on date of service.     Vivienne Grullon, DNP, APRN, CNP  [facility]        [employer] software used in the dictation of this note.

## 2024-11-01 ENCOUNTER — OFFICE VISIT (OUTPATIENT)
Dept: NEUROLOGY | Facility: CLINIC | Age: 59
End: 2024-11-01
Payer: COMMERCIAL

## 2024-11-01 VITALS
HEIGHT: 76 IN | BODY MASS INDEX: 32.88 KG/M2 | DIASTOLIC BLOOD PRESSURE: 74 MMHG | HEART RATE: 72 BPM | SYSTOLIC BLOOD PRESSURE: 128 MMHG | WEIGHT: 270 LBS

## 2024-11-01 DIAGNOSIS — F02.A0 MAJOR NEUROCOGNITIVE DISORDER, DUE TO FRONTOTEMPORAL LOBAR DEGENERATION, WITHOUT BEHAVIORAL DISTURBANCE, MILD (H): ICD-10-CM

## 2024-11-01 DIAGNOSIS — F02.80 FRONTOTEMPORAL DEMENTIA (H): ICD-10-CM

## 2024-11-01 DIAGNOSIS — G31.09 MAJOR NEUROCOGNITIVE DISORDER, DUE TO FRONTOTEMPORAL LOBAR DEGENERATION, WITHOUT BEHAVIORAL DISTURBANCE, MILD (H): ICD-10-CM

## 2024-11-01 DIAGNOSIS — G31.09 FRONTOTEMPORAL DEMENTIA (H): ICD-10-CM

## 2024-11-01 PROCEDURE — 99214 OFFICE O/P EST MOD 30 MIN: CPT

## 2024-11-01 RX ORDER — QUETIAPINE FUMARATE 25 MG/1
25 TABLET, FILM COATED ORAL 2 TIMES DAILY PRN
Qty: 60 TABLET | Refills: 5 | Status: SHIPPED | OUTPATIENT
Start: 2024-11-01

## 2024-11-01 RX ORDER — DONEPEZIL HYDROCHLORIDE 5 MG/1
5 TABLET, FILM COATED ORAL AT BEDTIME
COMMUNITY

## 2024-11-01 RX ORDER — DONEPEZIL HYDROCHLORIDE 10 MG/1
10 TABLET, FILM COATED ORAL AT BEDTIME
Qty: 90 TABLET | Refills: 3 | Status: SHIPPED | OUTPATIENT
Start: 2024-11-01
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
## 2024-11-01 NOTE — PATIENT INSTRUCTIONS
Plan:  --- Continue Aricept as prescribed:10 mg at bedtime.  --- Continue Seroquel as needed for agitation.   --- Plan on follow up in the Neurology Clinic in 12 months with Dr. Jackson.  --- Please feel free to reach out if you have any further questions or concerns.  --- Seek immediate medical attention if an emergency arises or if your health becomes progressively worse.     It was a pleasure meeting you today!

## 2025-01-31 ENCOUNTER — MYC MEDICAL ADVICE (OUTPATIENT)
Dept: NEUROLOGY | Facility: CLINIC | Age: 60
End: 2025-01-31
Payer: COMMERCIAL

## 2025-01-31 DIAGNOSIS — G47.26 SLEEP DISORDER, CIRCADIAN, SHIFT WORK TYPE: Primary | ICD-10-CM

## 2025-02-04 NOTE — TELEPHONE ENCOUNTER
Vivienne Grullon, APRN CNP  You1 hour ago (11:52 AM)       We could try a low dose of Trazodone. I would have her hold the Seroquel if she would like to try a sleep agent like trazodone.    Please let me know if she is interested.  Thank you,  Vivienne Nicholson reply sent with above message.     Anish TORRES RN, BSN  [facility] Neurology     Ambulatory

## 2025-02-05 DIAGNOSIS — F02.A0 MAJOR NEUROCOGNITIVE DISORDER, DUE TO FRONTOTEMPORAL LOBAR DEGENERATION, WITHOUT BEHAVIORAL DISTURBANCE, MILD (H): ICD-10-CM

## 2025-02-05 DIAGNOSIS — G31.09 MAJOR NEUROCOGNITIVE DISORDER, DUE TO FRONTOTEMPORAL LOBAR DEGENERATION, WITHOUT BEHAVIORAL DISTURBANCE, MILD (H): ICD-10-CM

## 2025-02-05 RX ORDER — CITALOPRAM HYDROBROMIDE 10 MG/1
10 TABLET ORAL DAILY
Qty: 90 TABLET | Refills: 2 | Status: SHIPPED | OUTPATIENT
Start: 2025-02-05

## 2025-02-05 NOTE — TELEPHONE ENCOUNTER
Refill request for: citalopram (CELEXA) 10 MG tablet    Directions: Take 1 tablet (10 mg) by mouth daily     LOV: 11/1/24  NOV: 11/3/25    90 day supply with 2 refills Medication T'd for review and signature  Chapis HOOD CMA on 2/5/2025 at 2:36 PM  [facility]

## 2025-02-07 NOTE — TELEPHONE ENCOUNTER
Anel sent with below information.     Anish TORRES RN, BSN  [facility] Neurology    I would want to hold the nighttime dose of Seroquel until we see how he responds to trazodone.  If he needed the evening dose of Seroquel we could drop dose in half if trazodone is something they want to continue with.    Thank you,  WOJCIECH Mcallister CNP

## 2025-02-18 RX ORDER — TRAZODONE HYDROCHLORIDE 50 MG/1
25 TABLET ORAL AT BEDTIME
Qty: 30 TABLET | Refills: 0 | Status: SHIPPED | OUTPATIENT
Start: 2025-02-18

## 2025-02-18 NOTE — TELEPHONE ENCOUNTER
I ordered trazodone 25 mg nightly.  Please tell the family to hold off on Seroquel at bedtime to see how he tolerates the trazodone.      If he needs Seroquel in addition to trazodone have him cut the dose of seroquel in half (12.5 mg).    Thank you,   WOJCIECH Mcallister CNP

## 2025-03-15 ENCOUNTER — HEALTH MAINTENANCE LETTER (OUTPATIENT)
Age: 60
End: 2025-03-15

## 2025-04-21 ENCOUNTER — MYC MEDICAL ADVICE (OUTPATIENT)
Dept: NEUROLOGY | Facility: CLINIC | Age: 60
End: 2025-04-21
Payer: COMMERCIAL

## 2025-04-21 NOTE — TELEPHONE ENCOUNTER
See patients hospital admission started 4/15 and spouse's questions below. I did ask her to communicate questions/concerns with hospital care team to determine if imaging is recommended.     Do you have any further input you would like to add?     Anish TORRES, RN, BSN  [facility] Neurology

## 2025-04-22 ENCOUNTER — PATIENT OUTREACH (OUTPATIENT)
Dept: CARE COORDINATION | Facility: CLINIC | Age: 60
End: 2025-04-22
Payer: COMMERCIAL

## 2025-04-22 NOTE — PROGRESS NOTES
Clinical Product Navigator RN reviewed chart; patient on payer product coverage.  Review results:   CPN Initial Information Gathering  Referral Source: Health Plan    Obtaining further information to determine needs and next steps.  Patient identified by their health plan for RN Clinical Product Navigator review.  Patient is currently inpatient at [facility] as of 4/15/25 due to adult failure to thrive and frontotemporal dementia.  Patient is awaiting placement.   RN Clinical Product Navigator will monitor for discharge disposition.    Melissa Behl BSN, RN, PHN, CCM  RN Clinical Product Navigator  [phone]

## 2025-04-24 ENCOUNTER — PATIENT OUTREACH (OUTPATIENT)
Dept: CARE COORDINATION | Facility: CLINIC | Age: 60
End: 2025-04-24
Payer: COMMERCIAL

## 2025-04-28 ENCOUNTER — PATIENT OUTREACH (OUTPATIENT)
Dept: CARE COORDINATION | Facility: CLINIC | Age: 60
End: 2025-04-28
Payer: COMMERCIAL

## 2025-04-28 NOTE — PROGRESS NOTES
S-(situation): RN Clinical Product Navigator chart review    B-(background): Patient identified by health plan for RN Clinical Product Navigator review due to hospital admission at [facility] on 4/15/25 for adult failure to thrive and frontotemporal demential.    A-(assessment): Patient discharged home with home health on 4/25/25.  Patient has an [facility] PCP.    R-(recommendations/plan): RN Clinical Product Navigator connected with [facility], patient is out of scope for [facility], but is connected with [facility] and care team.  No additional action for RN Clinical Product Navigator at this time.    Melissa Behl BSN, RN, PHN, CCM  RN Clinical Product Navigator  [phone]

## 2025-05-16 DIAGNOSIS — G47.26 SLEEP DISORDER, CIRCADIAN, SHIFT WORK TYPE: ICD-10-CM

## 2025-05-16 NOTE — TELEPHONE ENCOUNTER
Refill request for: trazodone 50mg   Directions: Take 0.5 tablets (25 mg) by mouth at bedtime.     LOV: 11/01/24  NOV: 11/03/25    90 day supply with 1 refills Medication T'd for review and signature    Priscilla Price LPN on 5/16/2025 at 4:57 PM

## 2025-05-19 RX ORDER — TRAZODONE HYDROCHLORIDE 50 MG/1
25 TABLET ORAL AT BEDTIME
Qty: 45 TABLET | Refills: 1 | Status: SHIPPED | OUTPATIENT
Start: 2025-05-19

## 2025-05-22 ENCOUNTER — PATIENT OUTREACH (OUTPATIENT)
Dept: CARE COORDINATION | Facility: CLINIC | Age: 60
End: 2025-05-22
Payer: COMMERCIAL

## 2025-05-22 NOTE — PROGRESS NOTES
Clinical Product Navigator RN reviewed chart; patient on payer product coverage.  Review results:   CPN Initial Information Gathering  Referral Source: Health Plan    Patient identified by Health Plan as a potential candidate for Primary Care Coordination due to recent admission. Per chart review, patient is currently admitted to [facility] for Aggressive behavior of adult due to Frontotemporal dementia. Patient was admitted with the intent for SNF placement. Patient's PCP is with [facility] and is well connected with PCP office and Clinic RNCC to support as needs arise (please see patient outreach note from 4/28/25). No additional action indicated at this time.     Chanell Whyte RN   Clinical Product Navigator   [email or web address]   Office: [phone]

## 2025-07-01 ENCOUNTER — DOCUMENTATION ONLY (OUTPATIENT)
Dept: OTHER | Facility: CLINIC | Age: 60
End: 2025-07-01
Payer: COMMERCIAL

## 2025-07-02 ENCOUNTER — ASSISTED LIVING VISIT (OUTPATIENT)
Dept: GERIATRICS | Facility: CLINIC | Age: 60
End: 2025-07-02
Payer: COMMERCIAL

## 2025-07-02 ENCOUNTER — DOCUMENTATION ONLY (OUTPATIENT)
Dept: GERIATRICS | Facility: CLINIC | Age: 60
End: 2025-07-02

## 2025-07-02 VITALS
OXYGEN SATURATION: 94 % | RESPIRATION RATE: 16 BRPM | BODY MASS INDEX: 32.76 KG/M2 | DIASTOLIC BLOOD PRESSURE: 80 MMHG | SYSTOLIC BLOOD PRESSURE: 118 MMHG | HEART RATE: 77 BPM | WEIGHT: 269 LBS | HEIGHT: 76 IN

## 2025-07-02 DIAGNOSIS — F51.01 PRIMARY INSOMNIA: ICD-10-CM

## 2025-07-02 DIAGNOSIS — Z71.89 ACP (ADVANCE CARE PLANNING): ICD-10-CM

## 2025-07-02 DIAGNOSIS — R26.9 ABNORMAL GAIT: ICD-10-CM

## 2025-07-02 DIAGNOSIS — F41.9 ANXIETY: Primary | ICD-10-CM

## 2025-07-02 DIAGNOSIS — M25.512 CHRONIC PAIN OF BOTH SHOULDERS: ICD-10-CM

## 2025-07-02 DIAGNOSIS — G31.09 FRONTOTEMPORAL DEMENTIA (H): ICD-10-CM

## 2025-07-02 DIAGNOSIS — G89.29 CHRONIC PAIN OF BOTH SHOULDERS: ICD-10-CM

## 2025-07-02 DIAGNOSIS — F02.80 FRONTOTEMPORAL DEMENTIA (H): ICD-10-CM

## 2025-07-02 DIAGNOSIS — M25.511 CHRONIC PAIN OF BOTH SHOULDERS: ICD-10-CM

## 2025-07-02 PROCEDURE — 99344 HOME/RES VST NEW MOD MDM 60: CPT | Performed by: NURSE PRACTITIONER

## 2025-07-02 RX ORDER — CITALOPRAM HYDROBROMIDE 20 MG/1
20 TABLET ORAL AT BEDTIME
COMMUNITY

## 2025-07-02 RX ORDER — QUETIAPINE FUMARATE 50 MG/1
50 TABLET, FILM COATED ORAL 4 TIMES DAILY
COMMUNITY

## 2025-07-02 RX ORDER — TRAZODONE HYDROCHLORIDE 100 MG/1
100 TABLET ORAL AT BEDTIME
COMMUNITY

## 2025-07-02 RX ORDER — TRAZODONE HYDROCHLORIDE 50 MG/1
50 TABLET ORAL 3 TIMES DAILY
COMMUNITY

## 2025-07-02 NOTE — LETTER
7/2/2025      Robbin Forrester  [address] [facility] [address]        [place] [facility]  PRIMARY CARE PROVIDER AND CLINIC:  Vic Hanna MD, [facility] [address] / [place]*  Chief Complaint   Patient presents with     Establish Care     [place] Medical Record Number:  [id number]  Place of Service where encounter took place:  [facility] ([facility]) [233]    Robbin Forrester  is a 60 year old  (1965), admitted to the above facility from  [facility] . Hospital stay 5/17/25 through 7/1/25..  Admitted to this facility for  medical management and nursing care.    HPI:    HPI information obtained from: facility chart records, facility staff, patient report, [facility] chart review, and family/first contact spouse report.   Brief Summary of Hospital Course:     Anxiety: 5/17/25 psych hosp. Stay at [facility]. Had increased anxiety, agitation, aggressive behaviors at times. Cont. On celexa, trazodone. No reports of increased anxiety since admission to [facility] 6/30/25    Frontotemporal dementia. Per chart, had primary progressive aphasia starting in 2021, functional, behaviors changes 2023. Has seen neuro. Last brain MRI 9/23. No acute changes. Cont. On seroquel, had IM zyprexa in hosp. As well as prn seroquel. Impulsive at times, exit seeking. Dc'd with increased sched. Seroquel dose. Zyprexa dc'd.  Behaviors stable since admission. Po intake stable.     Abnormal Gait: has wide-stance gait. Had fall in hosp. 5/25 without inj.     Insomnia: cont on hs trazodone. No reports of s/s since admission    Shoulder pain: chronic, L shoulder surg. 2003. Currently gets voltaren gel to bilat shoulders BID. No recent reports of pain or difficulties with ADLs    Updates on Status Since Skilled nursing Admission: no falls since admission. Mood gen. stable    CODE STATUS/ADVANCE DIRECTIVES DISCUSSION:   CPR/Full code   Patient's living condition: lives in an assisted living  "facility  ALLERGIES: Cat dander  PAST MEDICAL HISTORY:  has a past medical history of Frontotemporal dementia (H).  PAST SURGICAL HISTORY:   has a past surgical history that includes hernia repair; shoulder surgery (Left); Achilles Tendon Surgery (Right); and Elbow surgery (Left).  FAMILY HISTORY: family history includes Cancer in his mother.  SOCIAL HISTORY:   reports that he has never smoked. He has never used smokeless tobacco. He reports that he does not currently use drugs.    Post Discharge Medication Reconciliation Status: discharge medications reconciled and changed, per note/orders    Current Outpatient Medications   Medication Sig Dispense Refill     citalopram (CELEXA) 20 MG tablet Take 20 mg by mouth at bedtime.       diclofenac (VOLTAREN) 1 % topical gel Apply 1 g topically 2 times daily.       melatonin 3 MG tablet Take 3 mg by mouth at bedtime.       QUEtiapine (SEROQUEL) 50 MG tablet Take 50 mg by mouth 4 times daily.       traZODone (DESYREL) 100 MG tablet Take 100 mg by mouth at bedtime.       traZODone (DESYREL) 50 MG tablet Take 50 mg by mouth 3 times daily.           ROS:  Limited secondary to cognitive impairment but today pt reports no current pain    Vitals:  /80   Pulse 77   Resp 16   Ht 1.93 m (6' 4\")   Wt 122 kg (269 lb)   SpO2 94%   BMI 32.74 kg/m    Exam:  GENERAL APPEARANCE:  Alert, in no distress, cooperative  ENT:  Mouth and posterior oropharynx normal, moist mucous membranes, normal hearing acuity, adequate dentition  EYES:  EOM, conjunctivae, lids, pupils and irises normal, PERRL, no drainage  NECK:  No adenopathy,masses or thyromegaly, FROM, no carotid bruit  RESP:  respiratory effort and palpation of chest normal, lungs clear to auscultation , no respiratory distress  CV:  Palpation and auscultation of heart done , regular rate and rhythm, no murmur, rub, or gallop, no edema  ABDOMEN:  normal bowel sounds, soft, nontender, no hepatosplenomegaly or other masses, no " guarding or rebound, no bruits  M/S:   muscle strength 5/5 all 4 ext. Normal tone. Wide stance gait, appears steady.  NEURO:   Cranial nerves 2-12 are normal tested and grossly at patient's baseline, aphasia. 1-2 word responses to questions. No tremor  PSYCH:  affect and mood normal, no apparent anxiety    Lab/Diagnostic data:  Recent labs in [facility] reviewed by me today.     ASSESSMENT/PLAN:  (F41.9) Anxiety  (primary encounter diagnosis)  Comment: currently stable  Plan: 1. Cont. Celexa-recent dose increase  2. Cont. Tid trazodone  3. Reassess for s/s over next couple weeks    (G31.09,  F02.80) Frontotemporal dementia (H)  Comment: hosp. Stay for agitation, aggressive behaviors.   Plan: 1. Cont. Seroquel  2. Cont. Secured memory care unit  3. Reassess mood, behaviors over next few weeks, if stable consider GDR seroquel    (R26.9) Abnormal gait  Comment: wide stance gait. Fall 5/25 in hosp. No inj  Plan: 1. Monitor for unsteady gait, falls  2. For above, may refer to PT    (F51.01) Primary insomnia  Comment: currently stable  Plan: 1. Cont hs trazodone  2. For increase in s/s consider remeron vs celexa  3. Cont. To engage in activity throughout the day    (M25.511,  G89.29,  M25.512) Chronic pain of both shoulders  Comment: gen. stable  Plan: 1. Cont. Voltaren gel to shoulders  2. Monitor for reports of increased pain, decreased ROM    (Z71.89) ACP (advance care planning)  Comment: full code  Plan: 1. POLST in chart        Electronically signed by:  WOJCIECH Payan CNP                        Sincerely,        WOJCIECH Payan CNP    Electronically signed

## 2025-07-02 NOTE — PROGRESS NOTES
[place] [facility]  PRIMARY CARE PROVIDER AND CLINIC:  Vic Hanna MD, [facility] [address] / [place]*  Chief Complaint   Patient presents with    Establish Care     [facility] Medical Record Number:  [id number]  Place of Service where encounter took place:  [facility] ([facility]) [233]    Robbin Forrester  is a 60 year old  (1965), admitted to the above facility from  [facility] . Hospital stay 5/17/25 through 7/1/25..  Admitted to this facility for  medical management and nursing care.    HPI:    HPI information obtained from: facility chart records, facility staff, patient report, [facility] chart review, and family/first contact spouse report.   Brief Summary of Hospital Course:     Anxiety: 5/17/25 psych hosp. Stay at [facility]. Had increased anxiety, agitation, aggressive behaviors at times. Cont. On celexa, trazodone. No reports of increased anxiety since admission to AL 6/30/25    Frontotemporal dementia. Per chart, had primary progressive aphasia starting in 2021, functional, behaviors changes 2023. Has seen neuro. Last brain MRI 9/23. No acute changes. Cont. On seroquel, had IM zyprexa in hosp. As well as prn seroquel. Impulsive at times, exit seeking. Dc'd with increased sched. Seroquel dose. Zyprexa dc'd.  Behaviors stable since admission. Po intake stable.     Abnormal Gait: has wide-stance gait. Had fall in hosp. 5/25 without inj.     Insomnia: cont on hs trazodone. No reports of s/s since admission    Shoulder pain: chronic, L shoulder surg. 2003. Currently gets voltaren gel to bilat shoulders BID. No recent reports of pain or difficulties with ADLs    Updates on Status Since Skilled nursing Admission: no falls since admission. Mood gen. stable    CODE STATUS/ADVANCE DIRECTIVES DISCUSSION:   CPR/Full code   Patient's living condition: lives in an assisted living facility  ALLERGIES: Cat dander  PAST MEDICAL HISTORY:  has a past medical  "history of Frontotemporal dementia (H).  PAST SURGICAL HISTORY:   has a past surgical history that includes hernia repair; shoulder surgery (Left); Achilles Tendon Surgery (Right); and Elbow surgery (Left).  FAMILY HISTORY: family history includes Cancer in his mother.  SOCIAL HISTORY:   reports that he has never smoked. He has never used smokeless tobacco. He reports that he does not currently use drugs.    Post Discharge Medication Reconciliation Status: discharge medications reconciled and changed, per note/orders    Current Outpatient Medications   Medication Sig Dispense Refill    citalopram (CELEXA) 20 MG tablet Take 20 mg by mouth at bedtime.      diclofenac (VOLTAREN) 1 % topical gel Apply 1 g topically 2 times daily.      melatonin 3 MG tablet Take 3 mg by mouth at bedtime.      QUEtiapine (SEROQUEL) 50 MG tablet Take 50 mg by mouth 4 times daily.      traZODone (DESYREL) 100 MG tablet Take 100 mg by mouth at bedtime.      traZODone (DESYREL) 50 MG tablet Take 50 mg by mouth 3 times daily.           ROS:  Limited secondary to cognitive impairment but today pt reports no current pain    Vitals:  /80   Pulse 77   Resp 16   Ht 1.93 m (6' 4\")   Wt 122 kg (269 lb)   SpO2 94%   BMI 32.74 kg/m    Exam:  GENERAL APPEARANCE:  Alert, in no distress, cooperative  ENT:  Mouth and posterior oropharynx normal, moist mucous membranes, normal hearing acuity, adequate dentition  EYES:  EOM, conjunctivae, lids, pupils and irises normal, PERRL, no drainage  NECK:  No adenopathy,masses or thyromegaly, FROM, no carotid bruit  RESP:  respiratory effort and palpation of chest normal, lungs clear to auscultation , no respiratory distress  CV:  Palpation and auscultation of heart done , regular rate and rhythm, no murmur, rub, or gallop, no edema  ABDOMEN:  normal bowel sounds, soft, nontender, no hepatosplenomegaly or other masses, no guarding or rebound, no bruits  M/S:   muscle strength 5/5 all 4 ext. Normal tone. " Wide stance gait, appears steady.  NEURO:   Cranial nerves 2-12 are normal tested and grossly at patient's baseline, aphasia. 1-2 word responses to questions. No tremor  PSYCH:  affect and mood normal, no apparent anxiety    Lab/Diagnostic data:  Recent labs in [facility] reviewed by me today.     ASSESSMENT/PLAN:  (F41.9) Anxiety  (primary encounter diagnosis)  Comment: currently stable  Plan: 1. Cont. Celexa-recent dose increase  2. Cont. Tid trazodone  3. Reassess for s/s over next couple weeks    (G31.09,  F02.80) Frontotemporal dementia (H)  Comment: hosp. Stay for agitation, aggressive behaviors.   Plan: 1. Cont. Seroquel  2. Cont. Secured memory care unit  3. Reassess mood, behaviors over next few weeks, if stable consider GDR seroquel    (R26.9) Abnormal gait  Comment: wide stance gait. Fall 5/25 in hosp. No inj  Plan: 1. Monitor for unsteady gait, falls  2. For above, may refer to PT    (F51.01) Primary insomnia  Comment: currently stable  Plan: 1. Cont hs trazodone  2. For increase in s/s consider remeron vs celexa  3. Cont. To engage in activity throughout the day    (M25.511,  G89.29,  M25.512) Chronic pain of both shoulders  Comment: gen. stable  Plan: 1. Cont. Voltaren gel to shoulders  2. Monitor for reports of increased pain, decreased ROM    (Z71.89) ACP (advance care planning)  Comment: full code  Plan: 1. POLST in chart        Electronically signed by:  WOJCIECH Payan CNP

## 2025-07-14 ENCOUNTER — ASSISTED LIVING VISIT (OUTPATIENT)
Dept: GERIATRICS | Facility: CLINIC | Age: 60
End: 2025-07-14
Payer: COMMERCIAL

## 2025-07-14 VITALS
DIASTOLIC BLOOD PRESSURE: 77 MMHG | HEIGHT: 76 IN | HEART RATE: 79 BPM | SYSTOLIC BLOOD PRESSURE: 123 MMHG | OXYGEN SATURATION: 95 % | BODY MASS INDEX: 32.74 KG/M2 | RESPIRATION RATE: 18 BRPM

## 2025-07-14 DIAGNOSIS — F41.9 ANXIETY: ICD-10-CM

## 2025-07-14 DIAGNOSIS — L29.9 PRURITUS: ICD-10-CM

## 2025-07-14 DIAGNOSIS — R21 RASH: Primary | ICD-10-CM

## 2025-07-14 PROCEDURE — 99349 HOME/RES VST EST MOD MDM 40: CPT | Performed by: NURSE PRACTITIONER

## 2025-07-14 NOTE — PROGRESS NOTES
"[place] [facility]  [place] Medical Record Number:  [id number]  Place of Service where encounter took place:  [facility] ([facility]) [233]  Chief Complaint   Patient presents with    Derm Problem       HPI:    Robbin Forrester  is a 60 year old (1965), who is being seen today for an episodic care visit.  HPI information obtained from: facility chart records, facility staff, patient report, and [facility] chart review. Today's concern is: rash, pruritus, anxiety. Noted to have red areas of extremities, chest.  No recent med changes. Per staff, was outside over weekend. Red areas appear as insect bites of legs, Ues.  Faint red areas of chest/face-more gen. Redness.  Does report pruritus at times, per staff, itching at times. Has dementia with anxiety. Cont. On celexa, seroquel, trazodone. Per staff, mood gen. Stable. No reports of increased anxiety or insomnia.        Past Medical and Surgical History reviewed in Epic today.    MEDICATIONS:    Current Outpatient Medications   Medication Sig Dispense Refill    camphor-menthol (DERMASARRA) 0.5-0.5 % external lotion Apply 1 applicator topically 2 times daily.      citalopram (CELEXA) 20 MG tablet Take 20 mg by mouth at bedtime.      diclofenac (VOLTAREN) 1 % topical gel Apply 1 g topically 2 times daily.      melatonin 3 MG tablet Take 3 mg by mouth at bedtime.      QUEtiapine (SEROQUEL) 50 MG tablet Take 50 mg by mouth 4 times daily.      traZODone (DESYREL) 100 MG tablet Take 100 mg by mouth at bedtime.      traZODone (DESYREL) 50 MG tablet Take 50 mg by mouth 3 times daily.           REVIEW OF SYSTEMS:  Limited secondary to cognitive impairment but today pt reports some itching    Objective:  /77   Pulse 79   Resp 18   Ht 1.93 m (6' 4\")   SpO2 95%   BMI 32.74 kg/m    Exam:  GENERAL APPEARANCE:  Alert, in no distress, cooperative  ENT:  Mouth and posterior oropharynx normal, moist mucous membranes, normal hearing acuity  EYES:  EOM, " conjunctivae, lids, pupils and irises normal, PERRL  RESP:  respiratory effort and palpation of chest normal, lungs clear to auscultation , no respiratory distress  CV:  Palpation and auscultation of heart done , regular rate and rhythm, no murmur, rub, or gallop, no edema  ABDOMEN:  normal bowel sounds, soft, nontender, no hepatosplenomegaly or other masses, no guarding or rebound  M/S:   muscle strength 5/5 all 4 ext, normal tone  SKIN:  red, circumscribed areas of Les, Ues, not raised. No drainage. Gen red areas of face. Small, non-raised red areas-few, over chest.   NEURO:   Cranial nerves 2-12 are normal tested and grossly at patient's baseline, no tremor  PSYCH:  memory impaired , affect and mood normal, no apparent anxiety    Labs:   Recent labs in [facility] reviewed by me today.     ASSESSMENT/PLAN:  (R21) Rash  (primary encounter diagnosis)  Comment: appear to be insect bites on extremities. Was outside over weekend. Chest redness, facial redness may be heat rash.  Plan: 1. Start sarna lotion bid  2. Reassess over next couple days, if red areas not improved, consider HC cream, claritin    (L29.9) Pruritus  Comment: ongoing s/s r/t #1. Pruritus mostly of extremities per staff, not chest, face  Plan: 1. Sarna as above  2. For ongoing, increased itching, may consider hydroxyzine short-term    (F41.9) Anxiety  Comment: gen. stable  Plan: 1. Cont. Seroquel, celexa, trazodone  2. Monitor for increased anxiety, changes in behaviors  3. Monitor for insomnia              Electronically signed by:  WOJCIECH Payan CNP

## 2025-07-14 NOTE — LETTER
" 7/14/2025      Robbin Forrester  [address] [facility] [address]        [place] [facility]  [place] Medical Record Number:  [id number]  [facility] [233]  Chief Complaint   Patient presents with     Derm Problem       HPI:    Robbin Forrester  is a 60 year old (1965), who is being seen today for an episodic care visit.  HPI information obtained from: facility chart records, facility staff, patient report, and [facility] chart review. Today's concern is: rash, pruritus, anxiety. Noted to have red areas of extremities, chest.  No recent med changes. Per staff, was outside over weekend. Red areas appear as insect bites of legs, Ues.  Faint red areas of chest/face-more gen. Redness.  Does report pruritus at times, per staff, itching at times. Has dementia with anxiety. Cont. On celexa, seroquel, trazodone. Per staff, mood gen. Stable. No reports of increased anxiety or insomnia.        Past Medical and Surgical History reviewed in Epic today.    MEDICATIONS:    Current Outpatient Medications   Medication Sig Dispense Refill     camphor-menthol (DERMASARRA) 0.5-0.5 % external lotion Apply 1 applicator topically 2 times daily.       citalopram (CELEXA) 20 MG tablet Take 20 mg by mouth at bedtime.       diclofenac (VOLTAREN) 1 % topical gel Apply 1 g topically 2 times daily.       melatonin 3 MG tablet Take 3 mg by mouth at bedtime.       QUEtiapine (SEROQUEL) 50 MG tablet Take 50 mg by mouth 4 times daily.       traZODone (DESYREL) 100 MG tablet Take 100 mg by mouth at bedtime.       traZODone (DESYREL) 50 MG tablet Take 50 mg by mouth 3 times daily.           REVIEW OF SYSTEMS:  Limited secondary to cognitive impairment but today pt reports some itching    Objective:  /77   Pulse 79   Resp 18   Ht 1.93 m (6' 4\")   SpO2 95%   BMI 32.74 kg/m    Exam:  GENERAL APPEARANCE:  Alert, in no distress, cooperative  ENT:  Mouth and posterior " oropharynx normal, moist mucous membranes, normal hearing acuity  EYES:  EOM, conjunctivae, lids, pupils and irises normal, PERRL  RESP:  respiratory effort and palpation of chest normal, lungs clear to auscultation , no respiratory distress  CV:  Palpation and auscultation of heart done , regular rate and rhythm, no murmur, rub, or gallop, no edema  ABDOMEN:  normal bowel sounds, soft, nontender, no hepatosplenomegaly or other masses, no guarding or rebound  M/S:   muscle strength 5/5 all 4 ext, normal tone  SKIN:  red, circumscribed areas of Les, Ues, not raised. No drainage. Gen red areas of face. Small, non-raised red areas-few, over chest.   NEURO:   Cranial nerves 2-12 are normal tested and grossly at patient's baseline, no tremor  PSYCH:  memory impaired , affect and mood normal, no apparent anxiety    Labs:   Recent labs in [facility] reviewed by me today.     ASSESSMENT/PLAN:  (R21) Rash  (primary encounter diagnosis)  Comment: appear to be insect bites on extremities. Was outside over weekend. Chest redness, facial redness may be heat rash.  Plan: 1. Start sarna lotion bid  2. Reassess over next couple days, if red areas not improved, consider HC cream, claritin    (L29.9) Pruritus  Comment: ongoing s/s r/t #1. Pruritus mostly of extremities per staff, not chest, face  Plan: 1. Sarna as above  2. For ongoing, increased itching, may consider hydroxyzine short-term    (F41.9) Anxiety  Comment: gen. stable  Plan: 1. Cont. Seroquel, celexa, trazodone  2. Monitor for increased anxiety, changes in behaviors  3. Monitor for insomnia              Electronically signed by:  WOJCIECH Payan CNP              Sincerely,        WOJCIECH Payan CNP    Electronically signed

## 2025-08-26 ENCOUNTER — LAB REQUISITION (OUTPATIENT)
Dept: LAB | Facility: CLINIC | Age: 60
End: 2025-08-26
Payer: COMMERCIAL

## 2025-08-26 DIAGNOSIS — I10 ESSENTIAL (PRIMARY) HYPERTENSION: ICD-10-CM

## 2025-09-02 LAB
ANION GAP SERPL CALCULATED.3IONS-SCNC: 10 MMOL/L (ref 7–15)
BUN SERPL-MCNC: 12.2 MG/DL (ref 6–20)
CALCIUM SERPL-MCNC: 9.5 MG/DL (ref 8.8–10.4)
CHLORIDE SERPL-SCNC: 104 MMOL/L (ref 98–107)
CREAT SERPL-MCNC: 1.09 MG/DL (ref 0.67–1.17)
EGFRCR SERPLBLD CKD-EPI 2021: 79 ML/MIN/1.73M2
ERYTHROCYTE [DISTWIDTH] IN BLOOD BY AUTOMATED COUNT: 12.6 % (ref 10–15)
GLUCOSE SERPL-MCNC: 110 MG/DL (ref 70–99)
HCO3 SERPL-SCNC: 26 MMOL/L (ref 22–29)
HCT VFR BLD AUTO: 48.9 % (ref 40–53)
HGB BLD-MCNC: 15.9 G/DL (ref 13.3–17.7)
MCH RBC QN AUTO: 27.7 PG (ref 26.5–33)
MCHC RBC AUTO-ENTMCNC: 32.5 G/DL (ref 31.5–36.5)
MCV RBC AUTO: 85.3 FL (ref 78–100)
PLATELET # BLD AUTO: 201 10E3/UL (ref 150–450)
POTASSIUM SERPL-SCNC: 4.3 MMOL/L (ref 3.4–5.3)
RBC # BLD AUTO: 5.73 10E6/UL (ref 4.4–5.9)
SODIUM SERPL-SCNC: 140 MMOL/L (ref 135–145)
WBC # BLD AUTO: 7.17 10E3/UL (ref 4–11)